# Patient Record
Sex: FEMALE | Race: WHITE | HISPANIC OR LATINO | URBAN - METROPOLITAN AREA
[De-identification: names, ages, dates, MRNs, and addresses within clinical notes are randomized per-mention and may not be internally consistent; named-entity substitution may affect disease eponyms.]

---

## 2020-08-31 ENCOUNTER — EMERGENCY (EMERGENCY)
Facility: HOSPITAL | Age: 35
LOS: 1 days | Discharge: HOME | End: 2020-08-31
Attending: STUDENT IN AN ORGANIZED HEALTH CARE EDUCATION/TRAINING PROGRAM | Admitting: EMERGENCY MEDICINE
Payer: COMMERCIAL

## 2020-08-31 VITALS
OXYGEN SATURATION: 100 % | RESPIRATION RATE: 16 BRPM | DIASTOLIC BLOOD PRESSURE: 84 MMHG | SYSTOLIC BLOOD PRESSURE: 134 MMHG | TEMPERATURE: 98 F | HEART RATE: 87 BPM

## 2020-08-31 LAB
ALBUMIN SERPL ELPH-MCNC: 4.8 G/DL — SIGNIFICANT CHANGE UP (ref 3.3–5)
ALP SERPL-CCNC: 77 U/L — SIGNIFICANT CHANGE UP (ref 40–120)
ALT FLD-CCNC: 13 U/L — SIGNIFICANT CHANGE UP (ref 4–33)
ANION GAP SERPL CALC-SCNC: 12 MMO/L — SIGNIFICANT CHANGE UP (ref 7–14)
AST SERPL-CCNC: 17 U/L — SIGNIFICANT CHANGE UP (ref 4–32)
BASE EXCESS BLDV CALC-SCNC: 0.6 MMOL/L — SIGNIFICANT CHANGE UP
BASOPHILS # BLD AUTO: 0.04 K/UL — SIGNIFICANT CHANGE UP (ref 0–0.2)
BASOPHILS NFR BLD AUTO: 0.2 % — SIGNIFICANT CHANGE UP (ref 0–2)
BILIRUB SERPL-MCNC: < 0.2 MG/DL — LOW (ref 0.2–1.2)
BLOOD GAS VENOUS - CREATININE: 0.67 MG/DL — SIGNIFICANT CHANGE UP (ref 0.5–1.3)
BUN SERPL-MCNC: 8 MG/DL — SIGNIFICANT CHANGE UP (ref 7–23)
CALCIUM SERPL-MCNC: 9.5 MG/DL — SIGNIFICANT CHANGE UP (ref 8.4–10.5)
CHLORIDE BLDV-SCNC: 106 MMOL/L — SIGNIFICANT CHANGE UP (ref 96–108)
CHLORIDE SERPL-SCNC: 102 MMOL/L — SIGNIFICANT CHANGE UP (ref 98–107)
CO2 SERPL-SCNC: 24 MMOL/L — SIGNIFICANT CHANGE UP (ref 22–31)
CREAT SERPL-MCNC: 0.67 MG/DL — SIGNIFICANT CHANGE UP (ref 0.5–1.3)
EOSINOPHIL # BLD AUTO: 0 K/UL — SIGNIFICANT CHANGE UP (ref 0–0.5)
EOSINOPHIL NFR BLD AUTO: 0 % — SIGNIFICANT CHANGE UP (ref 0–6)
GAS PNL BLDV: 137 MMOL/L — SIGNIFICANT CHANGE UP (ref 136–146)
GLUCOSE BLDV-MCNC: 103 MG/DL — HIGH (ref 70–99)
GLUCOSE SERPL-MCNC: 98 MG/DL — SIGNIFICANT CHANGE UP (ref 70–99)
HCG SERPL-ACNC: 22.98 MIU/ML — SIGNIFICANT CHANGE UP
HCO3 BLDV-SCNC: 23 MMOL/L — SIGNIFICANT CHANGE UP (ref 20–27)
HCT VFR BLD CALC: 35.4 % — SIGNIFICANT CHANGE UP (ref 34.5–45)
HCT VFR BLDV CALC: 38.4 % — SIGNIFICANT CHANGE UP (ref 34.5–45)
HGB BLD-MCNC: 11.8 G/DL — SIGNIFICANT CHANGE UP (ref 11.5–15.5)
HGB BLDV-MCNC: 12.5 G/DL — SIGNIFICANT CHANGE UP (ref 11.5–15.5)
IMM GRANULOCYTES NFR BLD AUTO: 0.4 % — SIGNIFICANT CHANGE UP (ref 0–1.5)
LACTATE BLDV-MCNC: 1.9 MMOL/L — SIGNIFICANT CHANGE UP (ref 0.5–2)
LIDOCAIN IGE QN: 25.4 U/L — SIGNIFICANT CHANGE UP (ref 7–60)
LYMPHOCYTES # BLD AUTO: 1.92 K/UL — SIGNIFICANT CHANGE UP (ref 1–3.3)
LYMPHOCYTES # BLD AUTO: 11.9 % — LOW (ref 13–44)
MCHC RBC-ENTMCNC: 25.8 PG — LOW (ref 27–34)
MCHC RBC-ENTMCNC: 33.3 % — SIGNIFICANT CHANGE UP (ref 32–36)
MCV RBC AUTO: 77.3 FL — LOW (ref 80–100)
MONOCYTES # BLD AUTO: 0.6 K/UL — SIGNIFICANT CHANGE UP (ref 0–0.9)
MONOCYTES NFR BLD AUTO: 3.7 % — SIGNIFICANT CHANGE UP (ref 2–14)
NEUTROPHILS # BLD AUTO: 13.53 K/UL — HIGH (ref 1.8–7.4)
NEUTROPHILS NFR BLD AUTO: 83.8 % — HIGH (ref 43–77)
NRBC # FLD: 0 K/UL — SIGNIFICANT CHANGE UP (ref 0–0)
PCO2 BLDV: 50 MMHG — SIGNIFICANT CHANGE UP (ref 41–51)
PH BLDV: 7.33 PH — SIGNIFICANT CHANGE UP (ref 7.32–7.43)
PLATELET # BLD AUTO: 437 K/UL — HIGH (ref 150–400)
PMV BLD: 9.8 FL — SIGNIFICANT CHANGE UP (ref 7–13)
PO2 BLDV: 26 MMHG — LOW (ref 35–40)
POTASSIUM BLDV-SCNC: 3.7 MMOL/L — SIGNIFICANT CHANGE UP (ref 3.4–4.5)
POTASSIUM SERPL-MCNC: 4.2 MMOL/L — SIGNIFICANT CHANGE UP (ref 3.5–5.3)
POTASSIUM SERPL-SCNC: 4.2 MMOL/L — SIGNIFICANT CHANGE UP (ref 3.5–5.3)
PROT SERPL-MCNC: 8.2 G/DL — SIGNIFICANT CHANGE UP (ref 6–8.3)
RBC # BLD: 4.58 M/UL — SIGNIFICANT CHANGE UP (ref 3.8–5.2)
RBC # FLD: 13.8 % — SIGNIFICANT CHANGE UP (ref 10.3–14.5)
SAO2 % BLDV: 39.1 % — LOW (ref 60–85)
SODIUM SERPL-SCNC: 138 MMOL/L — SIGNIFICANT CHANGE UP (ref 135–145)
WBC # BLD: 16.15 K/UL — HIGH (ref 3.8–10.5)
WBC # FLD AUTO: 16.15 K/UL — HIGH (ref 3.8–10.5)

## 2020-08-31 PROCEDURE — 99218: CPT

## 2020-08-31 PROCEDURE — 76830 TRANSVAGINAL US NON-OB: CPT | Mod: 26

## 2020-08-31 PROCEDURE — 76705 ECHO EXAM OF ABDOMEN: CPT | Mod: 26

## 2020-08-31 RX ORDER — MORPHINE SULFATE 50 MG/1
4 CAPSULE, EXTENDED RELEASE ORAL ONCE
Refills: 0 | Status: DISCONTINUED | OUTPATIENT
Start: 2020-08-31 | End: 2020-08-31

## 2020-08-31 RX ORDER — ONDANSETRON 8 MG/1
4 TABLET, FILM COATED ORAL ONCE
Refills: 0 | Status: COMPLETED | OUTPATIENT
Start: 2020-08-31 | End: 2020-08-31

## 2020-08-31 RX ORDER — ACETAMINOPHEN 500 MG
1000 TABLET ORAL ONCE
Refills: 0 | Status: COMPLETED | OUTPATIENT
Start: 2020-08-31 | End: 2020-08-31

## 2020-08-31 RX ORDER — SODIUM CHLORIDE 9 MG/ML
1000 INJECTION INTRAMUSCULAR; INTRAVENOUS; SUBCUTANEOUS ONCE
Refills: 0 | Status: COMPLETED | OUTPATIENT
Start: 2020-08-31 | End: 2020-08-31

## 2020-08-31 RX ORDER — SODIUM CHLORIDE 9 MG/ML
1000 INJECTION INTRAMUSCULAR; INTRAVENOUS; SUBCUTANEOUS
Refills: 0 | Status: DISCONTINUED | OUTPATIENT
Start: 2020-08-31 | End: 2020-09-04

## 2020-08-31 RX ORDER — KETOROLAC TROMETHAMINE 30 MG/ML
15 SYRINGE (ML) INJECTION ONCE
Refills: 0 | Status: DISCONTINUED | OUTPATIENT
Start: 2020-08-31 | End: 2020-08-31

## 2020-08-31 RX ADMIN — Medication 1000 MILLIGRAM(S): at 23:45

## 2020-08-31 RX ADMIN — Medication 400 MILLIGRAM(S): at 23:16

## 2020-08-31 RX ADMIN — MORPHINE SULFATE 4 MILLIGRAM(S): 50 CAPSULE, EXTENDED RELEASE ORAL at 18:51

## 2020-08-31 RX ADMIN — ONDANSETRON 4 MILLIGRAM(S): 8 TABLET, FILM COATED ORAL at 18:46

## 2020-08-31 RX ADMIN — SODIUM CHLORIDE 1000 MILLILITER(S): 9 INJECTION INTRAMUSCULAR; INTRAVENOUS; SUBCUTANEOUS at 17:16

## 2020-08-31 NOTE — ED PROVIDER NOTE - ATTENDING CONTRIBUTION TO CARE
34 y/o F with np PMH p/w 1 day of rlq pain. Pain has been increasing since the am. She denies fever, chills, chest pain. LMP 2 weeks prior. she is currently on fertility treatment with clomipene and HCG injections. She states the pain was periumbilical then moved to the rlq was 8/10 then improved. She  had some nausea w/ the symptoms no vomiting. denies vaginal bleeding dysuria.   denies fever, chills, chest pain, SOB, +abdominal pain, diarrhea, dysuria, syncope, bleeding, new rash,weakness, numbness, blurred vision    ROS  otherwise negative as per HPI  Gen: Awake, Alert, WD, WN, NAD  Head:  NC/AT  Eyes:  PERRL, EOMI, Conjunctiva pink, lids normal, no scleral icterus  ENT: OP clear, no exudates, no erythema, uvula midline, moist mucus membranes  Neck: supple, nontender, no meningismus, no JVD, trachea midline  Cardiac/CV:  S1 S2, RRR, no M/G/R  Respiratory/Pulm:  CTAB, good air movement, normal resp effort, no wheezes/stridor/retractions/rales/rhonchi  Gastrointestinal/Abdomen:  Soft, tender RLQ, nondistended, +BS, no rebound/guarding  Back:  no CVAT, no MLT  Ext:  warm, well perfused, moving all extremities spontaneously, no peripheral edema, distal pulses intact  Skin: intact, no rash  Neuro:  AAOx3, sensation intact, motor 5/5 x 4 extremities, normal gait, speech clear

## 2020-08-31 NOTE — ED CDU PROVIDER INITIAL DAY NOTE - OBJECTIVE STATEMENT
HPI- Patient is a 35 y.o female with no known PMHx who presents to ED c/o Rt side abdominal pain x 1 day. Pt states that starting yesterday she woke up with sharp pain to Rt side of abdomen. Notes pain is intermittent and waxes and wanes in severity. Pt initially went to Urgent care but was advised to come to ED.  Pt notes that she is currently undergoing fertility treatment, clomipene and HCG injections, last treatment 5 days ago. Pt follows with Dr. Veronica Araiza. Pt seen and worked up in ED. +leukocytosis. Hcg 22.98, TVUS showing b/l hemorrhagic cysts and enlarged ovaries b/l due to fertility tx. Pt transferred to CDU for pain control and MRI a/p to r/o appendicitis.

## 2020-08-31 NOTE — ED ADULT NURSE NOTE - OBJECTIVE STATEMENT
Pt c/o abdominal pain, denies N/V, diarrhea. CT ordered, labs collected, examined by MD, continue to monitor.

## 2020-08-31 NOTE — ED CDU PROVIDER INITIAL DAY NOTE - PHYSICAL EXAMINATION
Vital signs reviewed.   CONSTITUTIONAL: Well-appearing; well-nourished; in no apparent distress. Non-toxic appearing.   HEAD: Normocephalic, atraumatic.  EYES: PERRL, EOM intact, conjunctiva and sclera WNL.  ENT: normal nose; no rhinorrhea;  CARD: Normal S1, S2; no murmurs, rubs, or gallops noted.  RESP: Normal chest excursion with respiration; breath sounds clear and equal bilaterally; no wheezes, rhonchi, or rales.  ABD/GI: soft, non-distended; +RLQ abd ttp.   EXT/MS: moves all extremities;  SKIN: Normal for age and race  NEURO: Awake, alert, oriented x 3, no gross deficits  PSYCH: Normal mood; appropriate affect.

## 2020-08-31 NOTE — ED CDU PROVIDER INITIAL DAY NOTE - ATTENDING CONTRIBUTION TO CARE
35 y.o female with RLQ pain sent to CDU for MRI r/o appy,  Pt notes that she is currently undergoing fertility treatment, last treatment 5 days ago. Pt seen and worked up in ED. +leukocytosis. Hcg 22.98, TVUS showing b/l hemorrhagic cysts and enlarged ovaries b/l due to fertility tx. Pt transferred to CDU for pain control and MRI a/p to r/o appendicitis.

## 2020-08-31 NOTE — ED ADULT TRIAGE NOTE - CHIEF COMPLAINT QUOTE
states " I am having right sided abdominal pain with nausea since this morning and sent in by my doctor to get a cat scan ."

## 2020-08-31 NOTE — ED CDU PROVIDER INITIAL DAY NOTE - MEDICAL DECISION MAKING DETAILS
Patient is a 35 y.o female with no known PMHx who presents to ED c/o Rt side abdominal pain x 1 day. Pt notes that she is currently undergoing fertility treatment, last treatment 5 days ago. Pt seen and worked up in ED. +leukocytosis. Hcg 22.98, TVUS showing b/l hemorrhagic cysts and enlarged ovaries b/l due to fertility tx. Pt transferred to CDU for pain control and MRI a/p to r/o appendicitis.

## 2020-08-31 NOTE — ED PROVIDER NOTE - PROGRESS NOTE DETAILS
Pt w/ positive pregnancy test due to HCG injections for fertility treatment, pt explained ct scan risk and radiation of pregnant , she understands states she is not pregnant and is aware of risk, form signed. Dr. Waters: I have personally seen and examined this patient at the bedside. I have fully participated in the care of this patient. I have reviewed all pertinent clinical information, including history, physical exam, plan and the PA's note and agree except as noted. HPI above as by me. PE above as by me. DDX PLAN

## 2020-08-31 NOTE — ED ADULT NURSE REASSESSMENT NOTE - NS ED NURSE REASSESS COMMENT FT1
pt A&Ox4 sitting on stretcher appears comfortable, c/o returning abdominal pain. COVID swab done. awaiting COVIDr for CDU bed. asking for food. ELISHA Diggs notified, pt to remain NPO until test in AM. pt updated.

## 2020-08-31 NOTE — ED PROVIDER NOTE - CLINICAL SUMMARY MEDICAL DECISION MAKING FREE TEXT BOX
34 y/o F with no PMH currently on fertility treatment p/w 1 day of rlq pain. Pain has been increasing since the am located in RLQ. pt w/ normal pelvic exam, concern for appendicitis vs colitis vs less likely ovarian pathology w/ normal pelvic exam , will obtain cbc, cmp, ua, vbg, lipase, ct abdomen pelvic, hcg + pt w/ hcg injections.

## 2020-09-01 VITALS
TEMPERATURE: 99 F | RESPIRATION RATE: 18 BRPM | DIASTOLIC BLOOD PRESSURE: 79 MMHG | OXYGEN SATURATION: 99 % | HEART RATE: 72 BPM | SYSTOLIC BLOOD PRESSURE: 121 MMHG

## 2020-09-01 LAB
ALBUMIN SERPL ELPH-MCNC: 4 G/DL — SIGNIFICANT CHANGE UP (ref 3.3–5)
ALP SERPL-CCNC: 69 U/L — SIGNIFICANT CHANGE UP (ref 40–120)
ALT FLD-CCNC: 13 U/L — SIGNIFICANT CHANGE UP (ref 4–33)
ANION GAP SERPL CALC-SCNC: 15 MMO/L — HIGH (ref 7–14)
APPEARANCE UR: SIGNIFICANT CHANGE UP
AST SERPL-CCNC: 15 U/L — SIGNIFICANT CHANGE UP (ref 4–32)
BACTERIA # UR AUTO: HIGH
BASOPHILS # BLD AUTO: 0.06 K/UL — SIGNIFICANT CHANGE UP (ref 0–0.2)
BASOPHILS NFR BLD AUTO: 0.3 % — SIGNIFICANT CHANGE UP (ref 0–2)
BILIRUB SERPL-MCNC: 0.3 MG/DL — SIGNIFICANT CHANGE UP (ref 0.2–1.2)
BILIRUB UR-MCNC: NEGATIVE — SIGNIFICANT CHANGE UP
BLOOD UR QL VISUAL: NEGATIVE — SIGNIFICANT CHANGE UP
BUN SERPL-MCNC: 8 MG/DL — SIGNIFICANT CHANGE UP (ref 7–23)
CALCIUM SERPL-MCNC: 9.2 MG/DL — SIGNIFICANT CHANGE UP (ref 8.4–10.5)
CHLORIDE SERPL-SCNC: 103 MMOL/L — SIGNIFICANT CHANGE UP (ref 98–107)
CO2 SERPL-SCNC: 21 MMOL/L — LOW (ref 22–31)
COLOR SPEC: SIGNIFICANT CHANGE UP
CREAT SERPL-MCNC: 0.68 MG/DL — SIGNIFICANT CHANGE UP (ref 0.5–1.3)
EOSINOPHIL # BLD AUTO: 0.01 K/UL — SIGNIFICANT CHANGE UP (ref 0–0.5)
EOSINOPHIL NFR BLD AUTO: 0.1 % — SIGNIFICANT CHANGE UP (ref 0–6)
GLUCOSE SERPL-MCNC: 91 MG/DL — SIGNIFICANT CHANGE UP (ref 70–99)
GLUCOSE UR-MCNC: NEGATIVE — SIGNIFICANT CHANGE UP
HCT VFR BLD CALC: 32.4 % — LOW (ref 34.5–45)
HGB BLD-MCNC: 11 G/DL — LOW (ref 11.5–15.5)
IMM GRANULOCYTES NFR BLD AUTO: 0.5 % — SIGNIFICANT CHANGE UP (ref 0–1.5)
KETONES UR-MCNC: SIGNIFICANT CHANGE UP
LEUKOCYTE ESTERASE UR-ACNC: NEGATIVE — SIGNIFICANT CHANGE UP
LYMPHOCYTES # BLD AUTO: 15.2 % — SIGNIFICANT CHANGE UP (ref 13–44)
LYMPHOCYTES # BLD AUTO: 2.73 K/UL — SIGNIFICANT CHANGE UP (ref 1–3.3)
MCHC RBC-ENTMCNC: 26.3 PG — LOW (ref 27–34)
MCHC RBC-ENTMCNC: 34 % — SIGNIFICANT CHANGE UP (ref 32–36)
MCV RBC AUTO: 77.3 FL — LOW (ref 80–100)
MONOCYTES # BLD AUTO: 1.02 K/UL — HIGH (ref 0–0.9)
MONOCYTES NFR BLD AUTO: 5.7 % — SIGNIFICANT CHANGE UP (ref 2–14)
NEUTROPHILS # BLD AUTO: 14.06 K/UL — HIGH (ref 1.8–7.4)
NEUTROPHILS NFR BLD AUTO: 78.2 % — HIGH (ref 43–77)
NITRITE UR-MCNC: NEGATIVE — SIGNIFICANT CHANGE UP
NRBC # FLD: 0 K/UL — SIGNIFICANT CHANGE UP (ref 0–0)
PH UR: 6 — SIGNIFICANT CHANGE UP (ref 5–8)
PLATELET # BLD AUTO: 394 K/UL — SIGNIFICANT CHANGE UP (ref 150–400)
PMV BLD: 10.1 FL — SIGNIFICANT CHANGE UP (ref 7–13)
POTASSIUM SERPL-MCNC: 4 MMOL/L — SIGNIFICANT CHANGE UP (ref 3.5–5.3)
POTASSIUM SERPL-SCNC: 4 MMOL/L — SIGNIFICANT CHANGE UP (ref 3.5–5.3)
PROT SERPL-MCNC: 7.5 G/DL — SIGNIFICANT CHANGE UP (ref 6–8.3)
PROT UR-MCNC: 10 — SIGNIFICANT CHANGE UP
RBC # BLD: 4.19 M/UL — SIGNIFICANT CHANGE UP (ref 3.8–5.2)
RBC # FLD: 13.8 % — SIGNIFICANT CHANGE UP (ref 10.3–14.5)
RBC CASTS # UR COMP ASSIST: SIGNIFICANT CHANGE UP (ref 0–?)
SARS-COV-2 RNA SPEC QL NAA+PROBE: SIGNIFICANT CHANGE UP
SODIUM SERPL-SCNC: 139 MMOL/L — SIGNIFICANT CHANGE UP (ref 135–145)
SP GR SPEC: 1.02 — SIGNIFICANT CHANGE UP (ref 1–1.04)
SQUAMOUS # UR AUTO: SIGNIFICANT CHANGE UP
UROBILINOGEN FLD QL: NORMAL — SIGNIFICANT CHANGE UP
WBC # BLD: 17.97 K/UL — HIGH (ref 3.8–10.5)
WBC # FLD AUTO: 17.97 K/UL — HIGH (ref 3.8–10.5)
WBC UR QL: HIGH (ref 0–?)

## 2020-09-01 PROCEDURE — 99217: CPT

## 2020-09-01 PROCEDURE — 72195 MRI PELVIS W/O DYE: CPT | Mod: 26

## 2020-09-01 PROCEDURE — 74181 MRI ABDOMEN W/O CONTRAST: CPT | Mod: 26

## 2020-09-01 RX ORDER — OMEPRAZOLE 10 MG/1
1 CAPSULE, DELAYED RELEASE ORAL
Qty: 30 | Refills: 0
Start: 2020-09-01 | End: 2020-09-30

## 2020-09-01 RX ORDER — ONDANSETRON 8 MG/1
1 TABLET, FILM COATED ORAL
Qty: 15 | Refills: 0
Start: 2020-09-01 | End: 2020-09-05

## 2020-09-01 RX ORDER — MORPHINE SULFATE 50 MG/1
4 CAPSULE, EXTENDED RELEASE ORAL ONCE
Refills: 0 | Status: DISCONTINUED | OUTPATIENT
Start: 2020-09-01 | End: 2020-09-01

## 2020-09-01 RX ADMIN — MORPHINE SULFATE 4 MILLIGRAM(S): 50 CAPSULE, EXTENDED RELEASE ORAL at 05:50

## 2020-09-01 RX ADMIN — MORPHINE SULFATE 4 MILLIGRAM(S): 50 CAPSULE, EXTENDED RELEASE ORAL at 05:33

## 2020-09-01 RX ADMIN — SODIUM CHLORIDE 100 MILLILITER(S): 9 INJECTION INTRAMUSCULAR; INTRAVENOUS; SUBCUTANEOUS at 00:48

## 2020-09-01 NOTE — ED CDU PROVIDER SUBSEQUENT DAY NOTE - PROGRESS NOTE DETAILS
Patient still having intermittent pain. Will give analgesic at this time. MRI results pending. The patient is resting comfortably and feels better, is alert and in no distress. The repeat examination is unremarkable and benign; in particular, there is no discomfort at McBurney's point and there is no pulsatile mass. The history, exam, diagnostic testing and current condition do not suggest acute appendicitis, bowel obstruction, acute cholecystitis, bowel perforation, major GI bleeding, severe diverticulitis, AAA, mesenteric ischemia, volvulus, sepsis, or other significant pathology to warrant further testing, continued ED treatment, admission, or surgical evaluation at this time. The vital signs have been stable. The patient does not have uncontrollable pain, intractable vomiting, or other significant symptoms. The patients condition is stable and appropriate for discharge from the ED. The patient will pursue further outpatient evaluation with the primary care physician or other designated or consulting physician as indicated in discharge instructions. Pt has no complaints, states abd pain much improved.  Discussed findings of MRI.  Will dc with pcp and fertility f/u as o/p.

## 2020-09-01 NOTE — ED CDU PROVIDER DISPOSITION NOTE - NSFOLLOWUPINSTRUCTIONS_ED_ALL_ED_FT
Advance activity as tolerated.  Continue all previously prescribed medications as directed unless otherwise instructed.  Follow up with your primary care physician in 48-72 hours- bring copies of your results.  Return to the ER for worsening or persistent symptoms, and/or ANY NEW OR CONCERNING SYMPTOMS. If you have issues obtaining follow up, please call: [phone] to obtain a doctor or specialist who takes your insurance in your area.  You may call [phone] to make an appointment with the internal medicine clinic.

## 2020-09-01 NOTE — ED CDU PROVIDER SUBSEQUENT DAY NOTE - PHYSICAL EXAMINATION
CONSTITUTIONAL: Well-appearing; well-nourished; in no apparent distress. Non-toxic appearing.   HEAD: Normocephalic, atraumatic.  EYES: PERRL, EOM intact, conjunctiva and sclera WNL.  ENT: normal nose; no rhinorrhea;  CARD: Normal S1, S2; no murmurs, rubs, or gallops noted.  RESP: Normal chest excursion with respiration; breath sounds clear and equal bilaterally; no wheezes, rhonchi, or rales.  ABD/GI: soft, non-distended; +RLQ abd ttp.   EXT/MS: moves all extremities;  SKIN: Normal for age and race  NEURO: Awake, alert, oriented x 3, no gross deficits  PSYCH: Normal mood; appropriate affect.

## 2020-09-01 NOTE — ED CDU PROVIDER DISPOSITION NOTE - CLINICAL COURSE
36 y/o female on fertility treatment with abd pain.  Sent to cdu for mri abd.  No acute intra-abd process.  Likely ovarian hyperstimulation syndrome.  Pain controlled.  Discharged with pcp and fertility md f/u as o/p.

## 2020-09-01 NOTE — ED CDU PROVIDER DISPOSITION NOTE - NSFOLLOWUPCLINICS_GEN_ALL_ED_FT
[facility]  Gastroenterology  [address] [place] [id number]  Phone: [phone]  Fax:   Follow Up Time:

## 2020-09-01 NOTE — ED CDU PROVIDER SUBSEQUENT DAY NOTE - HISTORY
CDU Initial Day Note: Patient is a 35 y.o female with no known PMHx who presents to ED c/o Rt side abdominal pain x 1 day. Pt notes that she is currently undergoing fertility treatment, last treatment 5 days ago. Pt seen and worked up in ED. +leukocytosis. Hcg 22.98, TVUS showing b/l hemorrhagic cysts and enlarged ovaries b/l due to fertility tx. Pt transferred to CDU for pain control and MRI a/p to r/o appendicitis.  CDU Subsequent Day Note: ELISHA Yee, Agree w/ above, pt feeling better, mild R sided abd pain, no acute intra-abdominal pathology noted on MR or US, appears very well, tolerating PO, stable to be discharged will give f/u info for GI.

## 2020-09-01 NOTE — ED CDU PROVIDER SUBSEQUENT DAY NOTE - ATTENDING CONTRIBUTION TO CARE
CDU MD CAMPOVERDE:  I performed a face to face bedside interview with patient regarding history of present illness, review of symptoms and past medical history. I completed an independent physical exam.  I have discussed patient's plan of care with PA.   I agree with note as stated above, having amended the EMR as needed to reflect my findings. I have discussed the assessment and plan of care.  This includes during the time I functioned as the attending physician for this patient.

## 2020-09-01 NOTE — ED CDU PROVIDER DISPOSITION NOTE - PATIENT PORTAL LINK FT
You can access the FollowMyHealth Patient Portal offered by [facility] by registering at the following website: [email or web address]. By joining [facility]’s FollowMyHealth portal, you will also be able to view your health information using other applications (apps) compatible with our system.

## 2020-09-04 NOTE — ED POST DISCHARGE NOTE - RESULT SUMMARY
ELISHA Osborn: UCX ecoli 50,000-99,000 cfu, pt seen for abdominal pain in the context of receiving fertility treatments. Pt called back to start abx, no answer, left message for callback. Callback PA to try to reach pt again.

## 2020-09-05 RX ORDER — CEPHALEXIN 500 MG
1 CAPSULE ORAL
Qty: 14 | Refills: 0
Start: 2020-09-05 | End: 2020-09-11

## 2020-09-26 ENCOUNTER — TRANSCRIPTION ENCOUNTER (OUTPATIENT)
Age: 35
End: 2020-09-26

## 2020-12-15 ENCOUNTER — TRANSCRIPTION ENCOUNTER (OUTPATIENT)
Age: 35
End: 2020-12-15

## 2021-05-17 ENCOUNTER — APPOINTMENT (OUTPATIENT)
Dept: PEDIATRIC CARDIOLOGY | Facility: CLINIC | Age: 36
End: 2021-05-17

## 2021-05-17 PROBLEM — Z00.00 ENCOUNTER FOR PREVENTIVE HEALTH EXAMINATION: Status: ACTIVE | Noted: 2021-05-17

## 2021-05-20 ENCOUNTER — APPOINTMENT (OUTPATIENT)
Dept: PEDIATRIC CARDIOLOGY | Facility: CLINIC | Age: 36
End: 2021-05-20
Payer: COMMERCIAL

## 2021-05-20 PROCEDURE — 93325 DOPPLER ECHO COLOR FLOW MAPG: CPT | Mod: 59

## 2021-05-20 PROCEDURE — 76820 UMBILICAL ARTERY ECHO: CPT | Mod: 59

## 2021-05-20 PROCEDURE — 76825 ECHO EXAM OF FETAL HEART: CPT

## 2021-05-20 PROCEDURE — 99202 OFFICE O/P NEW SF 15 MIN: CPT

## 2021-05-20 PROCEDURE — 76821 MIDDLE CEREBRAL ARTERY ECHO: CPT

## 2021-05-20 PROCEDURE — 76827 ECHO EXAM OF FETAL HEART: CPT | Mod: 59

## 2021-05-20 PROCEDURE — 76821 MIDDLE CEREBRAL ARTERY ECHO: CPT | Mod: 59

## 2021-05-20 PROCEDURE — 76825 ECHO EXAM OF FETAL HEART: CPT | Mod: 59

## 2021-05-20 PROCEDURE — 76820 UMBILICAL ARTERY ECHO: CPT

## 2021-06-29 ENCOUNTER — APPOINTMENT (OUTPATIENT)
Dept: MATERNAL FETAL MEDICINE | Facility: CLINIC | Age: 36
End: 2021-06-29
Payer: COMMERCIAL

## 2021-06-29 ENCOUNTER — ASOB RESULT (OUTPATIENT)
Age: 36
End: 2021-06-29

## 2021-06-29 PROCEDURE — G0109 DIAB MANAGE TRN IND/GROUP: CPT | Mod: 95

## 2021-06-29 RX ORDER — LANCETS 33 GAUGE
EACH MISCELLANEOUS
Qty: 2 | Refills: 1 | Status: ACTIVE | COMMUNITY
Start: 2021-06-29 | End: 1900-01-01

## 2021-06-29 RX ORDER — URINE ACETONE TEST STRIPS
STRIP MISCELLANEOUS
Qty: 1 | Refills: 0 | Status: ACTIVE | COMMUNITY
Start: 2021-06-29 | End: 1900-01-01

## 2021-06-29 RX ORDER — BLOOD-GLUCOSE METER
KIT MISCELLANEOUS 4 TIMES DAILY
Qty: 150 | Refills: 2 | Status: ACTIVE | COMMUNITY
Start: 2021-06-29 | End: 1900-01-01

## 2021-06-29 RX ORDER — BLOOD-GLUCOSE METER
W/DEVICE KIT MISCELLANEOUS
Qty: 1 | Refills: 0 | Status: ACTIVE | COMMUNITY
Start: 2021-06-29 | End: 1900-01-01

## 2021-07-15 ENCOUNTER — APPOINTMENT (OUTPATIENT)
Dept: MATERNAL FETAL MEDICINE | Facility: CLINIC | Age: 36
End: 2021-07-15
Payer: COMMERCIAL

## 2021-07-15 ENCOUNTER — ASOB RESULT (OUTPATIENT)
Age: 36
End: 2021-07-15

## 2021-07-15 PROCEDURE — G0108 DIAB MANAGE TRN  PER INDIV: CPT | Mod: 95

## 2021-07-30 ENCOUNTER — ASOB RESULT (OUTPATIENT)
Age: 36
End: 2021-07-30

## 2021-07-30 ENCOUNTER — APPOINTMENT (OUTPATIENT)
Dept: MATERNAL FETAL MEDICINE | Facility: CLINIC | Age: 36
End: 2021-07-30
Payer: COMMERCIAL

## 2021-07-30 PROCEDURE — G0108 DIAB MANAGE TRN  PER INDIV: CPT | Mod: 95

## 2021-08-19 ENCOUNTER — APPOINTMENT (OUTPATIENT)
Dept: MATERNAL FETAL MEDICINE | Facility: CLINIC | Age: 36
End: 2021-08-19
Payer: COMMERCIAL

## 2021-08-19 ENCOUNTER — ASOB RESULT (OUTPATIENT)
Age: 36
End: 2021-08-19

## 2021-08-19 PROCEDURE — G0108 DIAB MANAGE TRN  PER INDIV: CPT | Mod: 95

## 2021-08-27 ENCOUNTER — OUTPATIENT (OUTPATIENT)
Dept: OUTPATIENT SERVICES | Facility: HOSPITAL | Age: 36
LOS: 1 days | End: 2021-08-27
Payer: COMMERCIAL

## 2021-08-27 VITALS
TEMPERATURE: 98 F | SYSTOLIC BLOOD PRESSURE: 128 MMHG | WEIGHT: 235.01 LBS | HEIGHT: 69 IN | OXYGEN SATURATION: 96 % | DIASTOLIC BLOOD PRESSURE: 89 MMHG | RESPIRATION RATE: 18 BRPM | HEART RATE: 101 BPM

## 2021-08-27 DIAGNOSIS — Z01.818 ENCOUNTER FOR OTHER PREPROCEDURAL EXAMINATION: ICD-10-CM

## 2021-08-27 DIAGNOSIS — Z98.890 OTHER SPECIFIED POSTPROCEDURAL STATES: Chronic | ICD-10-CM

## 2021-08-27 DIAGNOSIS — O30.049 TWIN PREGNANCY, DICHORIONIC/DIAMNIOTIC, UNSPECIFIED TRIMESTER: ICD-10-CM

## 2021-08-27 DIAGNOSIS — O30.043 TWIN PREGNANCY, DICHORIONIC/DIAMNIOTIC, THIRD TRIMESTER: ICD-10-CM

## 2021-08-27 LAB
A1C WITH ESTIMATED AVERAGE GLUCOSE RESULT: 5.8 % — HIGH (ref 4–5.6)
ANION GAP SERPL CALC-SCNC: 15 MMOL/L — SIGNIFICANT CHANGE UP (ref 5–17)
BLD GP AB SCN SERPL QL: NEGATIVE — SIGNIFICANT CHANGE UP
BUN SERPL-MCNC: <4 MG/DL — LOW (ref 7–23)
CALCIUM SERPL-MCNC: 9.2 MG/DL — SIGNIFICANT CHANGE UP (ref 8.4–10.5)
CHLORIDE SERPL-SCNC: 103 MMOL/L — SIGNIFICANT CHANGE UP (ref 96–108)
CO2 SERPL-SCNC: 18 MMOL/L — LOW (ref 22–31)
CREAT SERPL-MCNC: 0.63 MG/DL — SIGNIFICANT CHANGE UP (ref 0.5–1.3)
ESTIMATED AVERAGE GLUCOSE: 120 MG/DL — HIGH (ref 68–114)
GLUCOSE SERPL-MCNC: 93 MG/DL — SIGNIFICANT CHANGE UP (ref 70–99)
HCT VFR BLD CALC: 28.7 % — LOW (ref 34.5–45)
HGB BLD-MCNC: 9.1 G/DL — LOW (ref 11.5–15.5)
MCHC RBC-ENTMCNC: 22.9 PG — LOW (ref 27–34)
MCHC RBC-ENTMCNC: 31.7 GM/DL — LOW (ref 32–36)
MCV RBC AUTO: 72.1 FL — LOW (ref 80–100)
NRBC # BLD: 0 /100 WBCS — SIGNIFICANT CHANGE UP (ref 0–0)
PLATELET # BLD AUTO: 501 K/UL — HIGH (ref 150–400)
POTASSIUM SERPL-MCNC: 3.7 MMOL/L — SIGNIFICANT CHANGE UP (ref 3.5–5.3)
POTASSIUM SERPL-SCNC: 3.7 MMOL/L — SIGNIFICANT CHANGE UP (ref 3.5–5.3)
RBC # BLD: 3.98 M/UL — SIGNIFICANT CHANGE UP (ref 3.8–5.2)
RBC # FLD: 15.9 % — HIGH (ref 10.3–14.5)
RH IG SCN BLD-IMP: POSITIVE — SIGNIFICANT CHANGE UP
SODIUM SERPL-SCNC: 136 MMOL/L — SIGNIFICANT CHANGE UP (ref 135–145)
WBC # BLD: 9.76 K/UL — SIGNIFICANT CHANGE UP (ref 3.8–10.5)
WBC # FLD AUTO: 9.76 K/UL — SIGNIFICANT CHANGE UP (ref 3.8–10.5)

## 2021-08-27 PROCEDURE — G0463: CPT

## 2021-08-27 PROCEDURE — 86850 RBC ANTIBODY SCREEN: CPT

## 2021-08-27 PROCEDURE — 80048 BASIC METABOLIC PNL TOTAL CA: CPT

## 2021-08-27 PROCEDURE — 85027 COMPLETE CBC AUTOMATED: CPT

## 2021-08-27 PROCEDURE — 86900 BLOOD TYPING SEROLOGIC ABO: CPT

## 2021-08-27 PROCEDURE — 83036 HEMOGLOBIN GLYCOSYLATED A1C: CPT

## 2021-08-27 PROCEDURE — 86901 BLOOD TYPING SEROLOGIC RH(D): CPT

## 2021-08-27 RX ORDER — SODIUM CHLORIDE 9 MG/ML
1000 INJECTION, SOLUTION INTRAVENOUS ONCE
Refills: 0 | Status: DISCONTINUED | OUTPATIENT
Start: 2021-09-09 | End: 2021-09-09

## 2021-08-27 RX ORDER — SODIUM CHLORIDE 9 MG/ML
1000 INJECTION, SOLUTION INTRAVENOUS
Refills: 0 | Status: DISCONTINUED | OUTPATIENT
Start: 2021-09-09 | End: 2021-09-09

## 2021-08-27 NOTE — OB PST NOTE - NSICDXFAMILYHX_GEN_ALL_CORE_FT
FAMILY HISTORY:  No pertinent family history in first degree relatives     FAMILY HISTORY:  Father  Still living? Unknown  FH: diabetes mellitus, Age at diagnosis: Age Unknown  FH: heart disease, Age at diagnosis: Age Unknown    Mother  Still living? Unknown  Family history of cervical cancer, Age at diagnosis: Age Unknown  FH: atrial fibrillation, Age at diagnosis: Age Unknown  FH: rheumatoid arthritis, Age at diagnosis: Age Unknown    Grandparent  Still living? Unknown  FH: asthma, Age at diagnosis: Age Unknown  FH: stomach cancer, Age at diagnosis: Age Unknown    Aunt  Still living? Unknown  FH: lung cancer, Age at diagnosis: Age Unknown  FH: skin cancer, Age at diagnosis: Age Unknown    Uncle  Still living? Unknown  FH: prostate cancer, Age at diagnosis: Age Unknown

## 2021-08-27 NOTE — OB PST NOTE - HISTORY OF PRESENT ILLNESS
36 year old female with history of gestational diabetes during second trimester presents today for presurgical testing. She is scheduled for Primary  for DI/DI twins on 21 (EDC:  21; ).      COVID swab- 21  COVID vaccine- Phizer 3/16/21, 21  36 year old female with history of gestational diabetes dx during second trimester presents today for presurgical testing. She is scheduled for Primary  for DI/DI twins on 21 (EDC:  21; ).  She denies recent known COVID exposure, and recent illness.  She is having swelling bilateral legs/feet.  She denies headaches and checks her BP frequently at home and has not had any elevations.  She denies s/s's of active labor.    COVID swab- 21  COVID vaccine- Phizer 3/16/21, 21

## 2021-08-27 NOTE — OB PST NOTE - NSICDXPASTMEDICALHX_GEN_ALL_CORE_FT
PAST MEDICAL HISTORY:  Anemia     Gestational diabetes managed through diet; no insulin     PAST MEDICAL HISTORY:  Anemia currently on iron    Gestational diabetes managed through diet; no insulin

## 2021-08-27 NOTE — OB PST NOTE - NSHPPHYSICALEXAM_GEN_ALL_CORE
Physical Exam:  · Constitutional-  well-developed; well-groomed; well-nourished; no distress  · Eyes- PERRLA; conjunctiva clear  · ENMT- No oral lesions; no gross abnormalities  · Neck-  supple; no JVD  · Breasts- not examined  · Back-  No deformity or limitation of movement   · Respiratory-  airway patent; breath sounds equal; good air movement; respirations non-labored; clear to auscultation bilaterally  · Cardiovascular-  regular rate and rhythm   · Gastrointestinal-  soft; nontender; no distention; bowel sounds normal; no guarding  · Genitourinary- patient refused   · Rectal-  patient refused   · Extremities- no clubbing; no cyanosis; pedal edema noted- nonpitting  · Vascular	Equal and normal pulses (carotids)   · Neurological- alert and oriented x 3  · Gait/Balance- gait steady  · Skin-  warm and dry; color normal  · Lymph Nodes	  · Posterior Cervical - normal  · Posterior Cervical R- normal  · Anterior Cervical L-  normal  · Anterior Cervical R- normal  · Supraclavicular L- normal  · Supraclavicular R-  normal  · Musculoskeletal- normal   · Psychiatric-  normal affect; normal behavior  gravid uterus denies s/s of active labor

## 2021-08-27 NOTE — OB PST NOTE - PROBLEM SELECTOR PLAN 1
-Scheduled for  Primary  for DI/DI twins on 21 (EDC:  21; )  -CBC, BMP, HgbA1C, and T&S today  -COVID vaccine (as noted in HPI)  -COVID swab scheduled for 21  -Preop instructions provided and patient stated understanding  -Surgical scrub provided, along with directions for use  -No gatorade protocol due to gestational diabetes  -No oral medications am DOS  -She was advised to f/u with OB regarding aspirin

## 2021-08-27 NOTE — OB PST NOTE - NSICDXPASTSURGICALHX_GEN_ALL_CORE_FT
PAST SURGICAL HISTORY:  H/O dilation and curettage polypectomy    S/P bilateral breast reduction

## 2021-08-31 PROBLEM — D64.9 ANEMIA, UNSPECIFIED: Chronic | Status: ACTIVE | Noted: 2021-08-27

## 2021-08-31 PROBLEM — O24.419 GESTATIONAL DIABETES MELLITUS IN PREGNANCY, UNSPECIFIED CONTROL: Chronic | Status: ACTIVE | Noted: 2021-08-27

## 2021-09-07 ENCOUNTER — OUTPATIENT (OUTPATIENT)
Dept: OUTPATIENT SERVICES | Facility: HOSPITAL | Age: 36
LOS: 1 days | End: 2021-09-07
Payer: COMMERCIAL

## 2021-09-07 DIAGNOSIS — Z00.00 ENCOUNTER FOR GENERAL ADULT MEDICAL EXAMINATION WITHOUT ABNORMAL FINDINGS: ICD-10-CM

## 2021-09-07 DIAGNOSIS — Z98.890 OTHER SPECIFIED POSTPROCEDURAL STATES: Chronic | ICD-10-CM

## 2021-09-07 DIAGNOSIS — Z11.52 ENCOUNTER FOR SCREENING FOR COVID-19: ICD-10-CM

## 2021-09-07 LAB — SARS-COV-2 RNA SPEC QL NAA+PROBE: SIGNIFICANT CHANGE UP

## 2021-09-07 PROCEDURE — C9803: CPT

## 2021-09-07 PROCEDURE — U0003: CPT

## 2021-09-07 PROCEDURE — U0005: CPT

## 2021-09-08 ENCOUNTER — TRANSCRIPTION ENCOUNTER (OUTPATIENT)
Age: 36
End: 2021-09-08

## 2021-09-09 ENCOUNTER — INPATIENT (INPATIENT)
Facility: HOSPITAL | Age: 36
LOS: 3 days | Discharge: HOME | End: 2021-09-13
Attending: OBSTETRICS & GYNECOLOGY | Admitting: OBSTETRICS & GYNECOLOGY
Payer: COMMERCIAL

## 2021-09-09 VITALS
TEMPERATURE: 98 F | HEART RATE: 96 BPM | RESPIRATION RATE: 20 BRPM | WEIGHT: 235.01 LBS | HEIGHT: 69 IN | OXYGEN SATURATION: 98 % | DIASTOLIC BLOOD PRESSURE: 117 MMHG | SYSTOLIC BLOOD PRESSURE: 176 MMHG

## 2021-09-09 DIAGNOSIS — O30.049 TWIN PREGNANCY, DICHORIONIC/DIAMNIOTIC, UNSPECIFIED TRIMESTER: ICD-10-CM

## 2021-09-09 DIAGNOSIS — Z98.890 OTHER SPECIFIED POSTPROCEDURAL STATES: Chronic | ICD-10-CM

## 2021-09-09 LAB
ALBUMIN SERPL ELPH-MCNC: 2.4 G/DL — LOW (ref 3.3–5)
ALBUMIN SERPL ELPH-MCNC: 3.2 G/DL — LOW (ref 3.3–5)
ALP SERPL-CCNC: 340 U/L — HIGH (ref 40–120)
ALP SERPL-CCNC: 453 U/L — HIGH (ref 40–120)
ALT FLD-CCNC: 15 U/L — SIGNIFICANT CHANGE UP (ref 10–45)
ALT FLD-CCNC: 9 U/L — LOW (ref 10–45)
ANION GAP SERPL CALC-SCNC: 12 MMOL/L — SIGNIFICANT CHANGE UP (ref 5–17)
ANION GAP SERPL CALC-SCNC: 15 MMOL/L — SIGNIFICANT CHANGE UP (ref 5–17)
ANISOCYTOSIS BLD QL: SLIGHT — SIGNIFICANT CHANGE UP
APPEARANCE UR: ABNORMAL
APTT BLD: 27.6 SEC — SIGNIFICANT CHANGE UP (ref 27.5–35.5)
APTT BLD: 27.7 SEC — SIGNIFICANT CHANGE UP (ref 27.5–35.5)
AST SERPL-CCNC: 21 U/L — SIGNIFICANT CHANGE UP (ref 10–40)
AST SERPL-CCNC: 22 U/L — SIGNIFICANT CHANGE UP (ref 10–40)
BACTERIA # UR AUTO: NEGATIVE — SIGNIFICANT CHANGE UP
BASOPHILS # BLD AUTO: 0.04 K/UL — SIGNIFICANT CHANGE UP (ref 0–0.2)
BASOPHILS # BLD AUTO: 0.22 K/UL — HIGH (ref 0–0.2)
BASOPHILS NFR BLD AUTO: 0.2 % — SIGNIFICANT CHANGE UP (ref 0–2)
BASOPHILS NFR BLD AUTO: 2.6 % — HIGH (ref 0–2)
BILIRUB SERPL-MCNC: 0.5 MG/DL — SIGNIFICANT CHANGE UP (ref 0.2–1.2)
BILIRUB SERPL-MCNC: 0.6 MG/DL — SIGNIFICANT CHANGE UP (ref 0.2–1.2)
BILIRUB UR-MCNC: NEGATIVE — SIGNIFICANT CHANGE UP
BUN SERPL-MCNC: 4 MG/DL — LOW (ref 7–23)
BUN SERPL-MCNC: <4 MG/DL — LOW (ref 7–23)
CALCIUM SERPL-MCNC: 7.9 MG/DL — LOW (ref 8.4–10.5)
CALCIUM SERPL-MCNC: 9.2 MG/DL — SIGNIFICANT CHANGE UP (ref 8.4–10.5)
CHLORIDE SERPL-SCNC: 105 MMOL/L — SIGNIFICANT CHANGE UP (ref 96–108)
CHLORIDE SERPL-SCNC: 108 MMOL/L — SIGNIFICANT CHANGE UP (ref 96–108)
CO2 SERPL-SCNC: 18 MMOL/L — LOW (ref 22–31)
CO2 SERPL-SCNC: 18 MMOL/L — LOW (ref 22–31)
COLOR SPEC: YELLOW — SIGNIFICANT CHANGE UP
COVID-19 SPIKE DOMAIN AB INTERP: POSITIVE
COVID-19 SPIKE DOMAIN ANTIBODY RESULT: >250 U/ML — HIGH
CREAT ?TM UR-MCNC: 105 MG/DL — SIGNIFICANT CHANGE UP
CREAT SERPL-MCNC: 0.68 MG/DL — SIGNIFICANT CHANGE UP (ref 0.5–1.3)
CREAT SERPL-MCNC: 0.68 MG/DL — SIGNIFICANT CHANGE UP (ref 0.5–1.3)
DIFF PNL FLD: NEGATIVE — SIGNIFICANT CHANGE UP
EOSINOPHIL # BLD AUTO: 0.01 K/UL — SIGNIFICANT CHANGE UP (ref 0–0.5)
EOSINOPHIL # BLD AUTO: 0.51 K/UL — HIGH (ref 0–0.5)
EOSINOPHIL NFR BLD AUTO: 0.1 % — SIGNIFICANT CHANGE UP (ref 0–6)
EOSINOPHIL NFR BLD AUTO: 6.1 % — HIGH (ref 0–6)
EPI CELLS # UR: 3 /HPF — SIGNIFICANT CHANGE UP
FIBRINOGEN PPP-MCNC: 560 MG/DL — HIGH (ref 290–520)
FIBRINOGEN PPP-MCNC: 832 MG/DL — HIGH (ref 290–520)
GIANT PLATELETS BLD QL SMEAR: PRESENT — SIGNIFICANT CHANGE UP
GLUCOSE BLDC GLUCOMTR-MCNC: 78 MG/DL — SIGNIFICANT CHANGE UP (ref 70–99)
GLUCOSE SERPL-MCNC: 81 MG/DL — SIGNIFICANT CHANGE UP (ref 70–99)
GLUCOSE SERPL-MCNC: 97 MG/DL — SIGNIFICANT CHANGE UP (ref 70–99)
GLUCOSE UR QL: NEGATIVE — SIGNIFICANT CHANGE UP
HCT VFR BLD CALC: 25.5 % — LOW (ref 34.5–45)
HCT VFR BLD CALC: 27.6 % — LOW (ref 34.5–45)
HCT VFR BLD CALC: 30.6 % — LOW (ref 34.5–45)
HGB BLD-MCNC: 7.8 G/DL — LOW (ref 11.5–15.5)
HGB BLD-MCNC: 8.9 G/DL — LOW (ref 11.5–15.5)
HGB BLD-MCNC: 9.6 G/DL — LOW (ref 11.5–15.5)
HYALINE CASTS # UR AUTO: 5 /LPF — HIGH (ref 0–2)
IMM GRANULOCYTES NFR BLD AUTO: 0.6 % — SIGNIFICANT CHANGE UP (ref 0–1.5)
INR BLD: 0.89 RATIO — SIGNIFICANT CHANGE UP (ref 0.88–1.16)
INR BLD: 0.93 RATIO — SIGNIFICANT CHANGE UP (ref 0.88–1.16)
KETONES UR-MCNC: NEGATIVE — SIGNIFICANT CHANGE UP
LDH SERPL L TO P-CCNC: 250 U/L — HIGH (ref 50–242)
LDH SERPL L TO P-CCNC: 319 U/L — HIGH (ref 50–242)
LEUKOCYTE ESTERASE UR-ACNC: NEGATIVE — SIGNIFICANT CHANGE UP
LYMPHOCYTES # BLD AUTO: 1.05 K/UL — SIGNIFICANT CHANGE UP (ref 1–3.3)
LYMPHOCYTES # BLD AUTO: 1.31 K/UL — SIGNIFICANT CHANGE UP (ref 1–3.3)
LYMPHOCYTES # BLD AUTO: 15.7 % — SIGNIFICANT CHANGE UP (ref 13–44)
LYMPHOCYTES # BLD AUTO: 5.6 % — LOW (ref 13–44)
MAGNESIUM SERPL-MCNC: 3.7 MG/DL — HIGH (ref 1.6–2.6)
MAGNESIUM SERPL-MCNC: 4.7 MG/DL — HIGH (ref 1.6–2.6)
MANUAL SMEAR VERIFICATION: SIGNIFICANT CHANGE UP
MCHC RBC-ENTMCNC: 22.5 PG — LOW (ref 27–34)
MCHC RBC-ENTMCNC: 23.2 PG — LOW (ref 27–34)
MCHC RBC-ENTMCNC: 24.7 PG — LOW (ref 27–34)
MCHC RBC-ENTMCNC: 30.6 GM/DL — LOW (ref 32–36)
MCHC RBC-ENTMCNC: 31.4 GM/DL — LOW (ref 32–36)
MCHC RBC-ENTMCNC: 32.2 GM/DL — SIGNIFICANT CHANGE UP (ref 32–36)
MCV RBC AUTO: 71.8 FL — LOW (ref 80–100)
MCV RBC AUTO: 75.9 FL — LOW (ref 80–100)
MCV RBC AUTO: 76.5 FL — LOW (ref 80–100)
MICROCYTES BLD QL: SLIGHT — SIGNIFICANT CHANGE UP
MONOCYTES # BLD AUTO: 0.36 K/UL — SIGNIFICANT CHANGE UP (ref 0–0.9)
MONOCYTES # BLD AUTO: 0.93 K/UL — HIGH (ref 0–0.9)
MONOCYTES NFR BLD AUTO: 4.3 % — SIGNIFICANT CHANGE UP (ref 2–14)
MONOCYTES NFR BLD AUTO: 5 % — SIGNIFICANT CHANGE UP (ref 2–14)
NEUTROPHILS # BLD AUTO: 16.55 K/UL — HIGH (ref 1.8–7.4)
NEUTROPHILS # BLD AUTO: 5.96 K/UL — SIGNIFICANT CHANGE UP (ref 1.8–7.4)
NEUTROPHILS NFR BLD AUTO: 71.3 % — SIGNIFICANT CHANGE UP (ref 43–77)
NEUTROPHILS NFR BLD AUTO: 88.5 % — HIGH (ref 43–77)
NITRITE UR-MCNC: NEGATIVE — SIGNIFICANT CHANGE UP
NRBC # BLD: 0 /100 WBCS — SIGNIFICANT CHANGE UP (ref 0–0)
NRBC # BLD: 0 /100 WBCS — SIGNIFICANT CHANGE UP (ref 0–0)
NRBC # BLD: 1 /100 — HIGH (ref 0–0)
PH UR: 7 — SIGNIFICANT CHANGE UP (ref 5–8)
PLAT MORPH BLD: NORMAL — SIGNIFICANT CHANGE UP
PLATELET # BLD AUTO: 356 K/UL — SIGNIFICANT CHANGE UP (ref 150–400)
PLATELET # BLD AUTO: 361 K/UL — SIGNIFICANT CHANGE UP (ref 150–400)
PLATELET # BLD AUTO: 455 K/UL — HIGH (ref 150–400)
POLYCHROMASIA BLD QL SMEAR: SLIGHT — SIGNIFICANT CHANGE UP
POTASSIUM SERPL-MCNC: 3.4 MMOL/L — LOW (ref 3.5–5.3)
POTASSIUM SERPL-MCNC: 3.8 MMOL/L — SIGNIFICANT CHANGE UP (ref 3.5–5.3)
POTASSIUM SERPL-SCNC: 3.4 MMOL/L — LOW (ref 3.5–5.3)
POTASSIUM SERPL-SCNC: 3.8 MMOL/L — SIGNIFICANT CHANGE UP (ref 3.5–5.3)
PROT ?TM UR-MCNC: 29 MG/DL — HIGH (ref 0–12)
PROT ?TM UR-MCNC: 29 MG/DL — HIGH (ref 0–12)
PROT SERPL-MCNC: 5.2 G/DL — LOW (ref 6–8.3)
PROT SERPL-MCNC: 6.8 G/DL — SIGNIFICANT CHANGE UP (ref 6–8.3)
PROT UR-MCNC: ABNORMAL
PROT/CREAT UR-RTO: 0.3 RATIO — HIGH (ref 0–0.2)
PROTHROM AB SERPL-ACNC: 10.7 SEC — SIGNIFICANT CHANGE UP (ref 10.6–13.6)
PROTHROM AB SERPL-ACNC: 11.2 SEC — SIGNIFICANT CHANGE UP (ref 10.6–13.6)
RBC # BLD: 3.36 M/UL — LOW (ref 3.8–5.2)
RBC # BLD: 3.61 M/UL — LOW (ref 3.8–5.2)
RBC # BLD: 4.26 M/UL — SIGNIFICANT CHANGE UP (ref 3.8–5.2)
RBC # FLD: 17.1 % — HIGH (ref 10.3–14.5)
RBC # FLD: 17.4 % — HIGH (ref 10.3–14.5)
RBC # FLD: 18.3 % — HIGH (ref 10.3–14.5)
RBC BLD AUTO: ABNORMAL
RBC CASTS # UR COMP ASSIST: 32 /HPF — HIGH (ref 0–4)
SARS-COV-2 IGG+IGM SERPL QL IA: >250 U/ML — HIGH
SARS-COV-2 IGG+IGM SERPL QL IA: POSITIVE
SODIUM SERPL-SCNC: 138 MMOL/L — SIGNIFICANT CHANGE UP (ref 135–145)
SODIUM SERPL-SCNC: 138 MMOL/L — SIGNIFICANT CHANGE UP (ref 135–145)
SP GR SPEC: 1.01 — SIGNIFICANT CHANGE UP (ref 1.01–1.02)
T PALLIDUM AB TITR SER: NEGATIVE — SIGNIFICANT CHANGE UP
URATE SERPL-MCNC: 5.9 MG/DL — SIGNIFICANT CHANGE UP (ref 2.5–7)
UROBILINOGEN FLD QL: NEGATIVE — SIGNIFICANT CHANGE UP
WBC # BLD: 16.27 K/UL — HIGH (ref 3.8–10.5)
WBC # BLD: 18.69 K/UL — HIGH (ref 3.8–10.5)
WBC # BLD: 8.36 K/UL — SIGNIFICANT CHANGE UP (ref 3.8–10.5)
WBC # FLD AUTO: 16.27 K/UL — HIGH (ref 3.8–10.5)
WBC # FLD AUTO: 18.69 K/UL — HIGH (ref 3.8–10.5)
WBC # FLD AUTO: 8.36 K/UL — SIGNIFICANT CHANGE UP (ref 3.8–10.5)
WBC UR QL: 5 /HPF — SIGNIFICANT CHANGE UP (ref 0–5)

## 2021-09-09 PROCEDURE — 88307 TISSUE EXAM BY PATHOLOGIST: CPT | Mod: 26

## 2021-09-09 RX ORDER — ONDANSETRON 8 MG/1
4 TABLET, FILM COATED ORAL EVERY 6 HOURS
Refills: 0 | Status: DISCONTINUED | OUTPATIENT
Start: 2021-09-09 | End: 2021-09-10

## 2021-09-09 RX ORDER — CEFAZOLIN SODIUM 1 G
2000 VIAL (EA) INJECTION ONCE
Refills: 0 | Status: COMPLETED | OUTPATIENT
Start: 2021-09-09 | End: 2021-09-09

## 2021-09-09 RX ORDER — OXYCODONE HYDROCHLORIDE 5 MG/1
5 TABLET ORAL
Refills: 0 | Status: COMPLETED | OUTPATIENT
Start: 2021-09-09 | End: 2021-09-16

## 2021-09-09 RX ORDER — OXYTOCIN 10 UNIT/ML
333.33 VIAL (ML) INJECTION
Qty: 20 | Refills: 0 | Status: DISCONTINUED | OUTPATIENT
Start: 2021-09-09 | End: 2021-09-13

## 2021-09-09 RX ORDER — LANOLIN
1 OINTMENT (GRAM) TOPICAL EVERY 6 HOURS
Refills: 0 | Status: DISCONTINUED | OUTPATIENT
Start: 2021-09-09 | End: 2021-09-13

## 2021-09-09 RX ORDER — FAMOTIDINE 10 MG/ML
20 INJECTION INTRAVENOUS ONCE
Refills: 0 | Status: COMPLETED | OUTPATIENT
Start: 2021-09-09 | End: 2021-09-09

## 2021-09-09 RX ORDER — ACETAMINOPHEN 500 MG
1000 TABLET ORAL ONCE
Refills: 0 | Status: COMPLETED | OUTPATIENT
Start: 2021-09-09 | End: 2021-09-09

## 2021-09-09 RX ORDER — INFLUENZA VIRUS VACCINE 15; 15; 15; 15 UG/.5ML; UG/.5ML; UG/.5ML; UG/.5ML
0.5 SUSPENSION INTRAMUSCULAR ONCE
Refills: 0 | Status: COMPLETED | OUTPATIENT
Start: 2021-09-09 | End: 2021-09-12

## 2021-09-09 RX ORDER — MAGNESIUM SULFATE 500 MG/ML
2 VIAL (ML) INJECTION
Qty: 40 | Refills: 0 | Status: DISCONTINUED | OUTPATIENT
Start: 2021-09-09 | End: 2021-09-09

## 2021-09-09 RX ORDER — MAGNESIUM HYDROXIDE 400 MG/1
30 TABLET, CHEWABLE ORAL
Refills: 0 | Status: DISCONTINUED | OUTPATIENT
Start: 2021-09-09 | End: 2021-09-13

## 2021-09-09 RX ORDER — CITRIC ACID/SODIUM CITRATE 300-500 MG
15 SOLUTION, ORAL ORAL ONCE
Refills: 0 | Status: COMPLETED | OUTPATIENT
Start: 2021-09-09 | End: 2021-09-09

## 2021-09-09 RX ORDER — MORPHINE SULFATE 50 MG/1
0.1 CAPSULE, EXTENDED RELEASE ORAL ONCE
Refills: 0 | Status: DISCONTINUED | OUTPATIENT
Start: 2021-09-09 | End: 2021-09-10

## 2021-09-09 RX ORDER — CHLORHEXIDINE GLUCONATE 213 G/1000ML
1 SOLUTION TOPICAL ONCE
Refills: 0 | Status: COMPLETED | OUTPATIENT
Start: 2021-09-09 | End: 2021-09-09

## 2021-09-09 RX ORDER — IBUPROFEN 200 MG
600 TABLET ORAL EVERY 6 HOURS
Refills: 0 | Status: COMPLETED | OUTPATIENT
Start: 2021-09-09 | End: 2022-08-08

## 2021-09-09 RX ORDER — LABETALOL HCL 100 MG
40 TABLET ORAL ONCE
Refills: 0 | Status: COMPLETED | OUTPATIENT
Start: 2021-09-09 | End: 2021-09-09

## 2021-09-09 RX ORDER — LABETALOL HCL 100 MG
80 TABLET ORAL ONCE
Refills: 0 | Status: COMPLETED | OUTPATIENT
Start: 2021-09-09 | End: 2021-09-09

## 2021-09-09 RX ORDER — HYDRALAZINE HCL 50 MG
5 TABLET ORAL ONCE
Refills: 0 | Status: COMPLETED | OUTPATIENT
Start: 2021-09-09 | End: 2021-09-09

## 2021-09-09 RX ORDER — HEPARIN SODIUM 5000 [USP'U]/ML
5000 INJECTION INTRAVENOUS; SUBCUTANEOUS EVERY 12 HOURS
Refills: 0 | Status: DISCONTINUED | OUTPATIENT
Start: 2021-09-09 | End: 2021-09-13

## 2021-09-09 RX ORDER — SODIUM CHLORIDE 9 MG/ML
1000 INJECTION, SOLUTION INTRAVENOUS
Refills: 0 | Status: DISCONTINUED | OUTPATIENT
Start: 2021-09-09 | End: 2021-09-10

## 2021-09-09 RX ORDER — OXYCODONE HYDROCHLORIDE 5 MG/1
5 TABLET ORAL ONCE
Refills: 0 | Status: DISCONTINUED | OUTPATIENT
Start: 2021-09-09 | End: 2021-09-13

## 2021-09-09 RX ORDER — TETANUS TOXOID, REDUCED DIPHTHERIA TOXOID AND ACELLULAR PERTUSSIS VACCINE, ADSORBED 5; 2.5; 8; 8; 2.5 [IU]/.5ML; [IU]/.5ML; UG/.5ML; UG/.5ML; UG/.5ML
0.5 SUSPENSION INTRAMUSCULAR ONCE
Refills: 0 | Status: DISCONTINUED | OUTPATIENT
Start: 2021-09-09 | End: 2021-09-13

## 2021-09-09 RX ORDER — ACETAMINOPHEN 500 MG
975 TABLET ORAL
Refills: 0 | Status: DISCONTINUED | OUTPATIENT
Start: 2021-09-09 | End: 2021-09-13

## 2021-09-09 RX ORDER — LABETALOL HCL 100 MG
20 TABLET ORAL ONCE
Refills: 0 | Status: COMPLETED | OUTPATIENT
Start: 2021-09-09 | End: 2021-09-09

## 2021-09-09 RX ORDER — MAGNESIUM SULFATE 500 MG/ML
4 VIAL (ML) INJECTION ONCE
Refills: 0 | Status: COMPLETED | OUTPATIENT
Start: 2021-09-09 | End: 2021-09-09

## 2021-09-09 RX ORDER — NALOXONE HYDROCHLORIDE 4 MG/.1ML
0.1 SPRAY NASAL
Refills: 0 | Status: DISCONTINUED | OUTPATIENT
Start: 2021-09-09 | End: 2021-09-10

## 2021-09-09 RX ORDER — DIPHENHYDRAMINE HCL 50 MG
25 CAPSULE ORAL EVERY 6 HOURS
Refills: 0 | Status: DISCONTINUED | OUTPATIENT
Start: 2021-09-09 | End: 2021-09-13

## 2021-09-09 RX ORDER — MAGNESIUM SULFATE 500 MG/ML
2 VIAL (ML) INJECTION
Qty: 40 | Refills: 0 | Status: DISCONTINUED | OUTPATIENT
Start: 2021-09-09 | End: 2021-09-10

## 2021-09-09 RX ORDER — SIMETHICONE 80 MG/1
80 TABLET, CHEWABLE ORAL EVERY 4 HOURS
Refills: 0 | Status: DISCONTINUED | OUTPATIENT
Start: 2021-09-09 | End: 2021-09-13

## 2021-09-09 RX ORDER — NALBUPHINE HYDROCHLORIDE 10 MG/ML
2.5 INJECTION, SOLUTION INTRAMUSCULAR; INTRAVENOUS; SUBCUTANEOUS EVERY 6 HOURS
Refills: 0 | Status: DISCONTINUED | OUTPATIENT
Start: 2021-09-09 | End: 2021-09-10

## 2021-09-09 RX ORDER — DEXAMETHASONE 0.5 MG/5ML
4 ELIXIR ORAL EVERY 6 HOURS
Refills: 0 | Status: DISCONTINUED | OUTPATIENT
Start: 2021-09-09 | End: 2021-09-10

## 2021-09-09 RX ORDER — OXYTOCIN 10 UNIT/ML
333.33 VIAL (ML) INJECTION
Qty: 20 | Refills: 0 | Status: COMPLETED | OUTPATIENT
Start: 2021-09-09 | End: 2021-09-09

## 2021-09-09 RX ADMIN — Medication 300 GRAM(S): at 10:00

## 2021-09-09 RX ADMIN — Medication 100 MILLIGRAM(S): at 10:58

## 2021-09-09 RX ADMIN — Medication 975 MILLIGRAM(S): at 21:44

## 2021-09-09 RX ADMIN — Medication 40 MILLIGRAM(S): at 09:21

## 2021-09-09 RX ADMIN — Medication 20 MILLIGRAM(S): at 08:51

## 2021-09-09 RX ADMIN — Medication 4 MILLIGRAM(S): at 10:59

## 2021-09-09 RX ADMIN — Medication 975 MILLIGRAM(S): at 22:47

## 2021-09-09 RX ADMIN — Medication 400 MILLIGRAM(S): at 14:06

## 2021-09-09 RX ADMIN — Medication 80 MILLIGRAM(S): at 09:47

## 2021-09-09 RX ADMIN — HEPARIN SODIUM 5000 UNIT(S): 5000 INJECTION INTRAVENOUS; SUBCUTANEOUS at 21:44

## 2021-09-09 RX ADMIN — CHLORHEXIDINE GLUCONATE 1 APPLICATION(S): 213 SOLUTION TOPICAL at 08:56

## 2021-09-09 RX ADMIN — Medication 50 GM/HR: at 13:41

## 2021-09-09 RX ADMIN — Medication 5 MILLIGRAM(S): at 10:07

## 2021-09-09 RX ADMIN — Medication 15 MILLILITER(S): at 08:55

## 2021-09-09 RX ADMIN — Medication 1000 MILLIUNIT(S)/MIN: at 13:42

## 2021-09-09 RX ADMIN — FAMOTIDINE 20 MILLIGRAM(S): 10 INJECTION INTRAVENOUS at 08:55

## 2021-09-09 NOTE — OB NEONATOLOGY/PEDIATRICIAN DELIVERY SUMMARY - NSPEDSNEONOTESA_OBGYN_ALL_OB_FT
Requested to attend [facility] CS delivery due to twin gestation.  Mother is a  37yo  at 38weeks of gestation. Prenatal labs A+, negative/NR/immune. GBS negative from . Maternal PMHx: unremarkable. Prenatal Care complicated by GDM diet controlled, preeclampsia, Labetalol X 3 on admission, Hydralazine X1 and started on Mag at 10AM.  ROM at delivery with clear fluid. Delivery by CS, Vertex presentation. Emerged vigorous. Delayed cord clamping for 30seconds. Warmed, dried, stimulated and suctioned. APGAR 8/9 for color.   Mother wants breast and bottle feeding, desires HepB vaccine.  Pediatrician

## 2021-09-09 NOTE — PRE-ANESTHESIA EVALUATION ADULT - NSANTHOSAYNRD_GEN_A_CORE
No. LEANDRA screening performed.  STOP BANG Legend: 0-2 = LOW Risk; 3-4 = INTERMEDIATE Risk; 5-8 = HIGH Risk

## 2021-09-09 NOTE — OB PROVIDER H&P - HISTORY OF PRESENT ILLNESS
36 year old female   @ 38w with Di-Di twin gestation, with history of gestational diabetes dx during second trimester. She is scheduled for Primary  for DI/DI twins on 21 (EDC:  21; ).  She denies recent known COVID exposure, and recent illness. She is having swelling bilateral legs/feet. reports + FM.  She denies headaches and checks her BP frequently at home and has not had any elevations.  She denies s/s's of active labor. denies vaginal bleeding or LOF. denies dizziness, SOB, CP or palpitations. denies HA, vision change, n/v or RUQ pain. Baby A: 4sga9lm, Baby B 5lb 13 oz.     COVID swab- 21  COVID vaccine- Phizer 3/16/21, 21     POBHx: Di-Di twin from IUI  PGYNHx: s/p D&C for uterine polyps   PMHx; GDMA1  PSHx: breast reduction, D&C  ALL: none  SH: denies t/e/d during pregnancy  Psych Hx: generalized anxiety   Meds: prenatal, iron, ASA

## 2021-09-09 NOTE — OB RN PATIENT PROFILE - NS TRANSFER PATIENT BELONGINGS
"The ABCs of the Annual Wellness Visit  Initial Medicare Wellness Visit    Chief Complaint   Patient presents with   • Medicare Wellness-Initial Visit     Discuss Coreg. Does not know if she is suppose to be taking this.        Subjective   History of Present Illness:  Mitzi Villafuerte is a 77 y.o. female who presents for an Initial Medicare Wellness Visit. Patient reports that she wanted to discuss today getting off blood pressure medicine. She had discontinued self for 2 days. She reports tachycardia, but blood pressure unchanged from home readings. Patient was started on bp medicine after chemotherapy due to cardiogenic affects lead to cardiomyopathy. Patient reports last Echo completed showed improvement of LVEF%. I do not have this report to review and unable to determine appropriateness without repeat echocardiogram, patient is unwilling to complete. She reports no side effects with blood pressure medicine, but wanted to \" just take less medicine if possible\".     She is not agreeable to vaccinations. She declines COVID, shingles, and TDAP at visit today. She is following COVID PPE and social distancing. She is agreeable to completing Mammogram and DXA bone scan. No history of osteoporosis.     She denies acute complaints today.     HEALTH RISK ASSESSMENT    Recent Hospitalizations:  No hospitalization(s) within the last year.    Current Medical Providers:  Patient Care Team:  Elio Mao MD as PCP - General (Internal Medicine)    Smoking Status:  Social History     Tobacco Use   Smoking Status Former Smoker   Smokeless Tobacco Never Used   Tobacco Comment    quit 55 years ago       Alcohol Consumption:  Social History     Substance and Sexual Activity   Alcohol Use No       Depression Screen:   PHQ-2/PHQ-9 Depression Screening 3/8/2021   Little interest or pleasure in doing things 0   Feeling down, depressed, or hopeless 0   Total Score 0       Fall Risk Screen:  IRASEMAADI Fall Risk Assessment was completed, " and patient is at LOW risk for falls.Assessment completed on:3/8/2021    Health Habits and Functional and Cognitive Screening:  Functional & Cognitive Status 3/8/2021   Do you have difficulty preparing food and eating? No   Do you have difficulty bathing yourself, getting dressed or grooming yourself? No   Do you have difficulty using the toilet? No   Do you have difficulty moving around from place to place? No   Do you have trouble with steps or getting out of a bed or a chair? No   Current Diet Well Balanced Diet   Dental Exam Up to date   Eye Exam Not up to date   Exercise (times per week) 3 times per week   Current Exercise Activities Include Walking   Do you need help using the phone?  No   Are you deaf or do you have serious difficulty hearing?  No   Do you need help with transportation? No   Do you need help shopping? No   Do you need help preparing meals?  No   Do you need help with housework?  No   Do you need help with laundry? No   Do you need help taking your medications? No   Do you need help managing money? No   Do you ever drive or ride in a car without wearing a seat belt? No   Have you felt unusual stress, anger or loneliness in the last month? No   Who do you live with? Alone   If you need help, do you have trouble finding someone available to you? No   Have you been bothered in the last four weeks by sexual problems? No   Do you have difficulty concentrating, remembering or making decisions? No         Does the patient have evidence of cognitive impairment? No    Asprin use counseling:Taking ASA appropriately as indicated    Age-appropriate Screening Schedule:  Refer to the list below for future screening recommendations based on patient's age, sex and/or medical conditions. Orders for these recommended tests are listed in the plan section. The patient has been provided with a written plan.    Health Maintenance   Topic Date Due   • DXA SCAN  Never done   • LIPID PANEL  Never done   • TDAP/TD  VACCINES (1 - Tdap) 03/08/2021 (Originally 11/6/1962)   • ZOSTER VACCINE (1 of 2) 03/08/2021 (Originally 11/6/1995)   • COLONOSCOPY  02/28/2022 (Originally 1943)   • INFLUENZA VACCINE  03/08/2022 (Originally 8/1/2020)          The following portions of the patient's history were reviewed and updated as appropriate: allergies, current medications, past family history, past medical history, past social history, past surgical history and problem list.    Outpatient Medications Prior to Visit   Medication Sig Dispense Refill   • Cholecalciferol (VITAMIN D3) 50 MCG (2000 UT) capsule Take 1 capsule by mouth Daily.     • escitalopram (LEXAPRO) 10 MG tablet TAKE 1 TABLET BY MOUTH EVERY DAY 90 tablet 3   • lisinopril (PRINIVIL,ZESTRIL) 5 MG tablet TAKE 1 TABLET BY MOUTH EVERY DAY 90 tablet 3   • oxyCODONE (ROXICODONE) 15 MG immediate release tablet Take 1 mg by mouth 4 (Four) Times a Day As Needed.     • pregabalin (Lyrica) 50 MG capsule Take 1 capsule by mouth Daily.     • vitamin B-6 (PYRIDOXINE) 50 MG tablet Take 50 mg by mouth Daily.     • zolpidem (AMBIEN) 10 MG tablet TAKE 1/2 TABLET BY MOUTH EVERY DAY AT BEDTIME 15 tablet 5   • carvedilol (Coreg) 12.5 MG tablet Take 1 tablet by mouth Daily.       No facility-administered medications prior to visit.       Patient Active Problem List   Diagnosis   • Closed displaced comminuted fracture of shaft of left femur (CMS/HCC)   • Closed displaced fracture of shaft of third metacarpal bone of left hand   • B-cell lymphoma (CMS/HCC)   • Cardiomyopathy (CMS/HCC)   • Chronic low back pain with left-sided sciatica   • Chronic pain disorder   • Chronic pain due to neoplasm   • Hip pain   • Hypertension, benign   • Lung mass   • Mixed hyperlipidemia   • Primary insomnia   • Vitamin D deficiency       Advanced Care Planning:  ACP discussion was held with the patient during this visit. Patient has an advance directive in EMR which is still valid.     Review of Systems  "  Constitutional: Negative for activity change, appetite change, fatigue, fever and unexpected weight change.   HENT: Negative for congestion, ear discharge, ear pain, hearing loss, postnasal drip, rhinorrhea, sinus pressure, tinnitus, trouble swallowing and voice change.    Eyes: Negative for discharge and visual disturbance.   Respiratory: Negative for apnea, cough and shortness of breath.    Cardiovascular: Negative for chest pain, palpitations and leg swelling.   Gastrointestinal: Negative for abdominal pain, blood in stool, constipation and rectal pain.   Endocrine: Negative for cold intolerance, heat intolerance, polydipsia and polyphagia.   Genitourinary: Negative for decreased urine volume, difficulty urinating, frequency, hematuria and urgency.   Musculoskeletal: Negative for arthralgias, back pain, myalgias, neck pain and neck stiffness.   Skin: Negative for color change, rash and wound.   Allergic/Immunologic: Negative for environmental allergies.   Neurological: Negative for dizziness, speech difficulty, weakness, numbness and headaches.   Hematological: Negative for adenopathy. Does not bruise/bleed easily.   Psychiatric/Behavioral: Negative for agitation, confusion, decreased concentration and sleep disturbance. The patient is not nervous/anxious.        Compared to one year ago, the patient feels her physical health is the same.  Compared to one year ago, the patient feels her mental health is the same.    Reviewed chart for potential of high risk medication in the elderly: yes  Reviewed chart for potential of harmful drug interactions in the elderly:yes    Objective         Vitals:    03/08/21 0901   BP: 136/70   Pulse: 107   Temp: 97.7 °F (36.5 °C)   SpO2: 98%   Weight: 52.2 kg (115 lb)   Height: 157.5 cm (62\")   PainSc: 0-No pain       Body mass index is 21.03 kg/m².  Discussed the patient's BMI with her. The BMI is in the acceptable range.    Physical Exam  Vitals and nursing note reviewed. "   Constitutional:       Appearance: Normal appearance. She is well-developed and normal weight.   HENT:      Head: Normocephalic and atraumatic.      Right Ear: Hearing, tympanic membrane, ear canal and external ear normal.      Left Ear: Hearing, tympanic membrane, ear canal and external ear normal.      Nose: Nose normal.      Mouth/Throat:      Lips: Pink.      Mouth: Mucous membranes are moist.      Pharynx: Oropharynx is clear. Uvula midline.   Eyes:      General: Lids are normal. Lids are everted, no foreign bodies appreciated. Vision grossly intact.      Extraocular Movements: Extraocular movements intact.      Conjunctiva/sclera: Conjunctivae normal.      Pupils: Pupils are equal, round, and reactive to light.   Neck:      Thyroid: No thyromegaly.   Cardiovascular:      Rate and Rhythm: Normal rate and regular rhythm.      Pulses: Normal pulses.      Heart sounds: Normal heart sounds.   Pulmonary:      Effort: Pulmonary effort is normal.      Breath sounds: Normal breath sounds.   Abdominal:      General: Bowel sounds are normal.      Palpations: Abdomen is soft.      Tenderness: There is no abdominal tenderness.   Musculoskeletal:      Cervical back: Normal range of motion and neck supple.      Right hip: No tenderness.      Left hip: Tenderness present.      Right lower leg: No edema.      Left lower leg: No edema.   Lymphadenopathy:      Head:      Right side of head: No submental, submandibular, tonsillar, preauricular, posterior auricular or occipital adenopathy.      Left side of head: No submental, submandibular, tonsillar, preauricular, posterior auricular or occipital adenopathy.      Cervical: No cervical adenopathy.   Skin:     General: Skin is warm and dry.      Capillary Refill: Capillary refill takes less than 2 seconds.   Neurological:      General: No focal deficit present.      Mental Status: She is alert and oriented to person, place, and time.   Psychiatric:         Attention and  Perception: Attention normal.         Mood and Affect: Mood normal.         Speech: Speech normal.         Behavior: Behavior normal. Behavior is cooperative.         Thought Content: Thought content normal.             Assessment/Plan   Medicare Risks and Personalized Health Plan  CMS Preventative Services Quick Reference  Breast Cancer/Mammogram Screening  Cardiovascular risk  Chronic Pain   Colon Cancer Screening  Dementia/Memory   Depression/Dysphoria  Diabetic Lab Screening   Fall Risk  Glaucoma Risk  Hearing Problem  Immunizations Discussed/Encouraged (specific immunizations; Td, adacel Tdap and Shingrix )  Inadequate Social Support, Isolation, Loneliness, Lack of Transportation, Financial Difficulties, or Caregiver Stress   Inactivity/Sedentary  Obesity/Overweight   Osteoprorosis Risk  Polypharmacy    The above risks/problems have been discussed with the patient.  Pertinent information has been shared with the patient in the After Visit Summary.  Follow up plans and orders are seen below in the Assessment/Plan Section.    Diagnoses and all orders for this visit:    1. Medicare annual wellness visit, initial (Primary)    2. Postmenopause  -     DEXA Bone Density Axial; Future    3. Encounter for screening mammogram for malignant neoplasm of breast  -     Mammo Screening Digital Tomosynthesis Bilateral With CAD; Future    4. Primary insomnia      Follow Up:  Return in about 6 months (around 9/8/2021) for HTN- Dr newby prefer. Continue on current medical therapies at this time. Will send order for mammogram and dexa bone scan to [facility] per patient preference. Patient not fasting at appointment today will return to complete labs later this week. Declined on vaccinations. Patient appropriately taking Ambien as needed for moderate to severe insomnia symptoms. JOSE reviewed at last refill and appropriate for use as prescribed. Patient verbalizes understanding the risk of continuing this type of medicine and  would like to continue related to significant benefit in her sleep. She had tried numerous other therapies without benefit.     An After Visit Summary and PPPS were given to the patient.            None

## 2021-09-09 NOTE — OB RN DELIVERY SUMMARY - NS_TIMERUPTUREDADMITTED_OBGYN_ALL_OB_FT
Eduardo Singh is a 75 year old male seen November 1, 2019 at [facility] where he has resided for 1 month (admit 9/2019) seen for initial visit.   He is seen in his room, ambulatory without device.   States he feels well, pleasant and smiling.   Significant impairment of his verbal skills, but says no to any pain or discomfort.     By chart review, patient has had progressive cognitive decline over the past 8 years, particularly with language.   He was seen by Neurology in 10/2013, noted to have a + family history of Alzheimer's dementia in first degree relatives    Pt thought to have early onset Alzheimer's dementia vs frontotemporal dementia and started on donepezil.   He had gradual progression until this past year, when he had more rapid changes and also developed agitation    He was started on quetiapine by his Neurologist    He initially admitted to [facility] for a respite stay, but wife has decided to have patient stay long term.  He had some trouble with transition at which time he was more paranoid and combative, and quetiapine dose was increased.    He goes to an Adult Day program, [facility] in [place], twice a week.       Past Medical History:   Diagnosis Date     Alzheimer disease (H)      Diabetes (H)      Erectile dysfunction      Hyperlipidemia      Hypertension      Impaired fasting glucose      Past Surgical History:   Procedure Laterality Date     CERVICAL LAMINECTOMY       SH:  Previously lived with his wife in [place]. They have 3 children.   Pt is retired from work as an  and controller.    ROS: unable to obtain secondary to aphasia.      EXAM:  NAD  /70   Pulse 62   Temp 98.2  F (36.8  C)   Resp 18   Wt 78.9 kg (174 lb)    Neck supple without adenopathy  Lungs clear bilaterally with fair air movement  Heart RRR s1s2  Abd soft, NT, no distention, +BS  Ext without edema  Neuro: +aphasia, steady gait  Psych: affect okay, smiling    Last Comprehensive  Metabolic Panel:  Sodium   Date Value Ref Range Status   10/01/2019 132 (A) 136 - 145 mmol/L Final     Potassium   Date Value Ref Range Status   10/01/2019 3.8 3.5 - 5.0 mmol/L Final     Chloride   Date Value Ref Range Status   10/01/2019 94 (A) 98 - 107 mmol/L Final     Carbon Dioxide   Date Value Ref Range Status   10/01/2019 28 22 - 31 mmol/L Final     Anion Gap   Date Value Ref Range Status   10/01/2019 10 5 - 18 mmol/L Final     Glucose   Date Value Ref Range Status   10/01/2019 118 70 - 125 mg/dL Final     Urea Nitrogen   Date Value Ref Range Status   10/01/2019 19 8 - 28 mg/dL Final     Creatinine   Date Value Ref Range Status   10/01/2019 1.21 0.70 - 1.30 mg/dL Final     GFR Estimate   Date Value Ref Range Status   10/01/2019 59 (L) >60 ml/min/1.73m2 Final     Calcium   Date Value Ref Range Status   10/01/2019 9.6 8.5 - 10.5 mg/dL Final     Lab Results   Component Value Date    AST 24 10/01/2019     Lab Results   Component Value Date    ALT 21 10/01/2019     Lab Results   Component Value Date    WBC 9.1 10/01/2019      HGB 14.4 10/01/2019      MCV 92 10/01/2019       10/01/2019     Lab Results   Component Value Date    CHOL 170 10/01/2019     Lab Results   Component Value Date    HDL 55 10/01/2019     Lab Results   Component Value Date    LDL 85 10/01/2019     Lab Results   Component Value Date    TRIG 150 10/01/2019       IMP/PLAN:     (I10) Essential hypertension    Comment:   BP Readings from Last 3 Encounters:   11/01/19 126/70   10/09/19 (!) 159/81   09/27/19 128/84      Pulse Readings from Last 4 Encounters:   11/01/19 62   10/09/19 70   09/27/19 64      Plan: lisinopril 20 mg/day, atenolol 50 mg daily and hydrochlorothiazide 25 mg/day    Will need to monitor for bradycardia on both atenolol and donepezil, and follow Na on hydrochlorothiazide.   Would look to decreasing dose if Na falls further.       (E11.22,  N18.3) Type 2 diabetes mellitus with stage 3 chronic kidney disease, without  long-term current use of insulin (H)  Comment:   Lab Results   Component Value Date    A1C 6.4 10/01/2019   Plan: diet-controlled.     He is on an ACEI and statin.        (F22) Paranoia (H)  Comment: with agitation and combativeness related to his worsening dementia    Plan: quetiapine 25 mg tid.  May be able to try GDR once he is settled.        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   He has been on donepezil >5 years, currently on 10 mg/day.    Monitor for bradycardia / GI symptoms.       Claudia Pascual MD    0 Hour(s) 1 Minute(s)

## 2021-09-09 NOTE — PRE-ANESTHESIA EVALUATION ADULT - NSANTHPMHFT_GEN_ALL_CORE
36 year old female with history of gestational diabetes dx during second trimester presents today for Primary  for DI/DI twins on 21 (EDC:  21; ).  She denies recent known COVID exposure, and recent illness.  She is having swelling bilateral legs/feet.  She is having elevated BPs in the severe range in pre-op so PEC labs have been sent.

## 2021-09-09 NOTE — OB PROVIDER H&P - ATTENDING COMMENTS
Pt admitted at 38 weeks for c/s for twins, noted on admission to have severe range BP, requiring labetalol push and Mag tx. Pt relatively asymptomatic, nl labs. Will proceed with c/s as scheduled.

## 2021-09-09 NOTE — OB RN DELIVERY SUMMARY - NSSELHIDDEN_OBGYN_ALL_OB_FT
[NS_DeliveryAttending1_OBGYN_ALL_OB_FT:[id number]==],[NS_DeliveryAssist1_OBGYN_ALL_OB_FT:[id number]=],[NS_DeliveryRN_OBGYN_ALL_OB_FT:[id number]=],[NS_CirculateRN2_OBGYN_ALL_OB_FT:[id number]==],[NS_DeliveryAttending2_OBGYN_ALL_OB_FT:[id number]=],[NS_DeliveryAssist2_OBGYN_ALL_OB_FT:[id number]=]

## 2021-09-09 NOTE — OB RN PATIENT PROFILE - NSICDXPASTMEDICALHX_GEN_ALL_CORE_FT
PAST MEDICAL HISTORY:  Anemia currently on iron    Gestational diabetes managed through diet; no insulin

## 2021-09-09 NOTE — OB RN INTRAOPERATIVE NOTE - NSSELHIDDEN_OBGYN_ALL_OB_FT
[NS_DeliveryAttending1_OBGYN_ALL_OB_FT:[id number]==],[NS_DeliveryAssist1_OBGYN_ALL_OB_FT:[id number]=],[NS_DeliveryRN_OBGYN_ALL_OB_FT:[id number]=],[NS_CirculateRN2_OBGYN_ALL_OB_FT:[id number]==] [NS_DeliveryAttending1_OBGYN_ALL_OB_FT:[id number]==],[NS_DeliveryAssist1_OBGYN_ALL_OB_FT:[id number]=],[NS_DeliveryRN_OBGYN_ALL_OB_FT:[id number]=],[NS_CirculateRN2_OBGYN_ALL_OB_FT:[id number]==],[NS_DeliveryAttending2_OBGYN_ALL_OB_FT:[id number]=] [NS_DeliveryAttending1_OBGYN_ALL_OB_FT:[id number]==],[NS_DeliveryAssist1_OBGYN_ALL_OB_FT:[id number]=],[NS_DeliveryRN_OBGYN_ALL_OB_FT:[id number]=],[NS_CirculateRN2_OBGYN_ALL_OB_FT:[id number]==],[NS_DeliveryAttending2_OBGYN_ALL_OB_FT:[id number]=],[NS_DeliveryAssist2_OBGYN_ALL_OB_FT:[id number]=]

## 2021-09-09 NOTE — OB RN DELIVERY SUMMARY - NS_SEPSISRSKCALC_OBGYN_ALL_OB_FT
EOS calculated successfully. EOS Risk Factor: 0.5/1000 live births ([facility] national incidence); GA=38w;Temp=97.5; ROM=0.017; GBS='Negative'; Antibiotics='No antibiotics or any antibiotics < 2 hrs prior to birth'

## 2021-09-09 NOTE — OB PROVIDER H&P - NSHPPHYSICALEXAM_GEN_ALL_CORE
Gen: NAD, A&O x 4  Pulm: non-labor breathing  Heart: RRR  Abd: soft, gravid. NT  Ext: skin warm to touch

## 2021-09-09 NOTE — OB PROVIDER DELIVERY SUMMARY - NSPROVIDERDELIVERYNOTE_OBGYN_ALL_OB_FT
Procedure: pLTCS  Preop Dx: di-di twins, cephalic/breech  QBL:3200cc  IVF:  3200mL crystalloids  UOP: 450ml  Layers of uterine closure: one  Complications: bladder flap hematoma  Specimen: placenta   Findings: grossly normal uterus, fallopian tubes, bilaterally enlarged ovaries, bladder flap hematoma briskly bleeding, non-expanding  Hemostatic/intraoperative agents: mercedes & fibrillar  Baby: Baby A Female vertex 6#11, Baby B Female breech 6#9    Nallely Mcguire, PGY2 Procedure: pLTCS  Preop Dx: di-di twins, cephalic/breech  QBL:3200cc  IVF:  3200mL crystalloids  UOP: 450ml  Layers of uterine closure: one  Complications: bladder flap hematoma  Specimen: placenta x2 for twins  Findings: grossly normal uterus, fallopian tubes, bilaterally enlarged ovaries, bladder flap hematoma briskly bleeding, Hemostasis achieved with interrupted sutures/figure 8 in ANNA MARIE. non-expanding  Hemostatic/intraoperative agents: mercedes & fibrillar  Baby: Baby A Female vertex 6#11, Baby B Female breech 6#9  2 uprbc given intraop, TXA, pr cytotec    Nallely Mcguire, PGY2  Dr STEW Franco

## 2021-09-09 NOTE — OB NEONATOLOGY/PEDIATRICIAN DELIVERY SUMMARY - NSPEDSNEONOTESB_OBGYN_ALL_OB_FT
Baby girl born at 38 weeks to a 35yo  via primary C/S. Mother is  A+, negative/NR/immune. GBS negative from . Maternal PMHx: unremarkable. Prenatal history significant for GDM diet controlled, preeclampsia, Labetalol X 3 on admission, Hydralazine X1 and started on Mag at 10AM. AROM at delivery with clear fluids. Baby emerged BREECH with good tone and cord clamping was delayed 30 sec. Infant placed on warmer dried and stimulated. CPAP initiated at 12 mins of life due to increased WOB. CPAP 5 21% given for 20 mins. APGARS 8 /9 given. Mom would like to breast and bottle feed.

## 2021-09-09 NOTE — OB PROVIDER H&P - ASSESSMENT
a/p 37 y/o  @ 38w with Di-Di twin here for scheduled elective primary  section     - admit to L&D  - NPO/IVF  - EFM and toco  - Hypertension: 's/110's. asymptomatic. sent HELP labs. will give Labetalol IVP. Continue monitor vitals  - pre-op labs were done   - Anesthesia consult  - for primary CS    Plan of care for Dr. Salvador Nicole PA-C a/p 37 y/o  @ 38w with Di-Di twin here for scheduled elective primary  section     - admit to L&D  - NPO/IVF  - EFM and toco  - Hypertension: 's/110's. asymptomatic. sent HELLP labs. will give Labetalol IVP. Continue monitor vitals  - pre-op labs were done   - Anesthesia consult  - for primary CS    Plan of care for Dr. Salvador Nicole PA-C

## 2021-09-09 NOTE — OB RN PATIENT PROFILE - NS_DATEOFLASTVISIT_OBGYN_ALL_OB_DT
Alert-The patient is alert, awake and responds to voice. The patient is oriented to time, place, and person. The triage nurse is able to obtain subjective information.
Awake/Alert
07-Sep-2021

## 2021-09-09 NOTE — OB PROVIDER DELIVERY SUMMARY - NSSELHIDDEN_OBGYN_ALL_OB_FT
[NS_DeliveryAttending1_OBGYN_ALL_OB_FT:[id number]==],[NS_DeliveryAssist1_OBGYN_ALL_OB_FT:[id number]=],[NS_DeliveryRN_OBGYN_ALL_OB_FT:[id number]=],[NS_CirculateRN2_OBGYN_ALL_OB_FT:[id number]==],[NS_DeliveryAttending2_OBGYN_ALL_OB_FT:[id number]=]

## 2021-09-09 NOTE — OB RN INTRAOPERATIVE NOTE - NS_ADDITIONALPROCINFO1_OBGYN_ALL_OB_FT
QBL =  urine =  EBL as per MD = QBL = 3196ml as per Triton  urine = 450ml out of OR, blue urine  EBL as per MD = 3200 ml    Pt received 2units PRBC in OR, Pt received cytotec 1000mcg rectally per MD request, Pt received TXA per MD request

## 2021-09-09 NOTE — OB RN PATIENT PROFILE - NSICDXFAMILYHX_GEN_ALL_CORE_FT
FAMILY HISTORY:  Father  Still living? Unknown  FH: diabetes mellitus, Age at diagnosis: Age Unknown  FH: heart disease, Age at diagnosis: Age Unknown    Mother  Still living? Unknown  Family history of cervical cancer, Age at diagnosis: Age Unknown  FH: atrial fibrillation, Age at diagnosis: Age Unknown  FH: rheumatoid arthritis, Age at diagnosis: Age Unknown    Grandparent  Still living? Unknown  FH: asthma, Age at diagnosis: Age Unknown  FH: stomach cancer, Age at diagnosis: Age Unknown    Aunt  Still living? Unknown  FH: lung cancer, Age at diagnosis: Age Unknown  FH: skin cancer, Age at diagnosis: Age Unknown    Uncle  Still living? Unknown  FH: prostate cancer, Age at diagnosis: Age Unknown

## 2021-09-10 LAB
ALBUMIN SERPL ELPH-MCNC: 2.3 G/DL — LOW (ref 3.3–5)
ALP SERPL-CCNC: 311 U/L — HIGH (ref 40–120)
ALT FLD-CCNC: 10 U/L — SIGNIFICANT CHANGE UP (ref 10–45)
ANION GAP SERPL CALC-SCNC: 10 MMOL/L — SIGNIFICANT CHANGE UP (ref 5–17)
APTT BLD: 27.4 SEC — LOW (ref 27.5–35.5)
AST SERPL-CCNC: 25 U/L — SIGNIFICANT CHANGE UP (ref 10–40)
BASOPHILS # BLD AUTO: 0.05 K/UL — SIGNIFICANT CHANGE UP (ref 0–0.2)
BASOPHILS NFR BLD AUTO: 0.3 % — SIGNIFICANT CHANGE UP (ref 0–2)
BILIRUB SERPL-MCNC: 0.3 MG/DL — SIGNIFICANT CHANGE UP (ref 0.2–1.2)
BUN SERPL-MCNC: <4 MG/DL — LOW (ref 7–23)
CALCIUM SERPL-MCNC: 7.2 MG/DL — LOW (ref 8.4–10.5)
CHLORIDE SERPL-SCNC: 103 MMOL/L — SIGNIFICANT CHANGE UP (ref 96–108)
CO2 SERPL-SCNC: 20 MMOL/L — LOW (ref 22–31)
CREAT SERPL-MCNC: 0.72 MG/DL — SIGNIFICANT CHANGE UP (ref 0.5–1.3)
EOSINOPHIL # BLD AUTO: 0.02 K/UL — SIGNIFICANT CHANGE UP (ref 0–0.5)
EOSINOPHIL NFR BLD AUTO: 0.1 % — SIGNIFICANT CHANGE UP (ref 0–6)
FIBRINOGEN PPP-MCNC: 598 MG/DL — HIGH (ref 290–520)
GLUCOSE SERPL-MCNC: 119 MG/DL — HIGH (ref 70–99)
HCT VFR BLD CALC: 25.2 % — LOW (ref 34.5–45)
HGB BLD-MCNC: 8 G/DL — LOW (ref 11.5–15.5)
IMM GRANULOCYTES NFR BLD AUTO: 0.8 % — SIGNIFICANT CHANGE UP (ref 0–1.5)
INR BLD: 0.86 RATIO — LOW (ref 0.88–1.16)
LDH SERPL L TO P-CCNC: 346 U/L — HIGH (ref 50–242)
LYMPHOCYTES # BLD AUTO: 1.85 K/UL — SIGNIFICANT CHANGE UP (ref 1–3.3)
LYMPHOCYTES # BLD AUTO: 10.5 % — LOW (ref 13–44)
MAGNESIUM SERPL-MCNC: 4.7 MG/DL — HIGH (ref 1.6–2.6)
MCHC RBC-ENTMCNC: 24 PG — LOW (ref 27–34)
MCHC RBC-ENTMCNC: 31.7 GM/DL — LOW (ref 32–36)
MCV RBC AUTO: 75.4 FL — LOW (ref 80–100)
MONOCYTES # BLD AUTO: 1.15 K/UL — HIGH (ref 0–0.9)
MONOCYTES NFR BLD AUTO: 6.6 % — SIGNIFICANT CHANGE UP (ref 2–14)
NEUTROPHILS # BLD AUTO: 14.33 K/UL — HIGH (ref 1.8–7.4)
NEUTROPHILS NFR BLD AUTO: 81.7 % — HIGH (ref 43–77)
NRBC # BLD: 0 /100 WBCS — SIGNIFICANT CHANGE UP (ref 0–0)
PLATELET # BLD AUTO: 337 K/UL — SIGNIFICANT CHANGE UP (ref 150–400)
POTASSIUM SERPL-MCNC: 4 MMOL/L — SIGNIFICANT CHANGE UP (ref 3.5–5.3)
POTASSIUM SERPL-SCNC: 4 MMOL/L — SIGNIFICANT CHANGE UP (ref 3.5–5.3)
PROT SERPL-MCNC: 5.1 G/DL — LOW (ref 6–8.3)
PROTHROM AB SERPL-ACNC: 10.4 SEC — LOW (ref 10.6–13.6)
RBC # BLD: 3.34 M/UL — LOW (ref 3.8–5.2)
RBC # FLD: 18.2 % — HIGH (ref 10.3–14.5)
SODIUM SERPL-SCNC: 133 MMOL/L — LOW (ref 135–145)
URATE SERPL-MCNC: 5.8 MG/DL — SIGNIFICANT CHANGE UP (ref 2.5–7)
WBC # BLD: 17.54 K/UL — HIGH (ref 3.8–10.5)
WBC # FLD AUTO: 17.54 K/UL — HIGH (ref 3.8–10.5)

## 2021-09-10 RX ORDER — OXYCODONE HYDROCHLORIDE 5 MG/1
5 TABLET ORAL
Refills: 0 | Status: DISCONTINUED | OUTPATIENT
Start: 2021-09-10 | End: 2021-09-13

## 2021-09-10 RX ORDER — OXYCODONE HYDROCHLORIDE 5 MG/1
5 TABLET ORAL ONCE
Refills: 0 | Status: DISCONTINUED | OUTPATIENT
Start: 2021-09-10 | End: 2021-09-11

## 2021-09-10 RX ORDER — SENNA PLUS 8.6 MG/1
2 TABLET ORAL AT BEDTIME
Refills: 0 | Status: DISCONTINUED | OUTPATIENT
Start: 2021-09-10 | End: 2021-09-13

## 2021-09-10 RX ADMIN — SENNA PLUS 2 TABLET(S): 8.6 TABLET ORAL at 21:44

## 2021-09-10 RX ADMIN — HEPARIN SODIUM 5000 UNIT(S): 5000 INJECTION INTRAVENOUS; SUBCUTANEOUS at 10:32

## 2021-09-10 RX ADMIN — Medication 975 MILLIGRAM(S): at 10:20

## 2021-09-10 RX ADMIN — OXYCODONE HYDROCHLORIDE 5 MILLIGRAM(S): 5 TABLET ORAL at 14:34

## 2021-09-10 RX ADMIN — OXYCODONE HYDROCHLORIDE 5 MILLIGRAM(S): 5 TABLET ORAL at 17:54

## 2021-09-10 RX ADMIN — Medication 975 MILLIGRAM(S): at 22:13

## 2021-09-10 RX ADMIN — OXYCODONE HYDROCHLORIDE 5 MILLIGRAM(S): 5 TABLET ORAL at 09:10

## 2021-09-10 RX ADMIN — OXYCODONE HYDROCHLORIDE 5 MILLIGRAM(S): 5 TABLET ORAL at 21:44

## 2021-09-10 RX ADMIN — SIMETHICONE 80 MILLIGRAM(S): 80 TABLET, CHEWABLE ORAL at 18:46

## 2021-09-10 RX ADMIN — Medication 975 MILLIGRAM(S): at 09:48

## 2021-09-10 RX ADMIN — Medication 975 MILLIGRAM(S): at 21:43

## 2021-09-10 RX ADMIN — OXYCODONE HYDROCHLORIDE 5 MILLIGRAM(S): 5 TABLET ORAL at 15:05

## 2021-09-10 RX ADMIN — OXYCODONE HYDROCHLORIDE 5 MILLIGRAM(S): 5 TABLET ORAL at 22:13

## 2021-09-10 RX ADMIN — OXYCODONE HYDROCHLORIDE 5 MILLIGRAM(S): 5 TABLET ORAL at 18:29

## 2021-09-10 RX ADMIN — SIMETHICONE 80 MILLIGRAM(S): 80 TABLET, CHEWABLE ORAL at 14:34

## 2021-09-10 RX ADMIN — Medication 975 MILLIGRAM(S): at 15:45

## 2021-09-10 RX ADMIN — SIMETHICONE 80 MILLIGRAM(S): 80 TABLET, CHEWABLE ORAL at 08:37

## 2021-09-10 RX ADMIN — OXYCODONE HYDROCHLORIDE 5 MILLIGRAM(S): 5 TABLET ORAL at 08:37

## 2021-09-10 RX ADMIN — Medication 975 MILLIGRAM(S): at 02:38

## 2021-09-10 RX ADMIN — Medication 975 MILLIGRAM(S): at 15:15

## 2021-09-10 RX ADMIN — HEPARIN SODIUM 5000 UNIT(S): 5000 INJECTION INTRAVENOUS; SUBCUTANEOUS at 21:44

## 2021-09-10 RX ADMIN — Medication 975 MILLIGRAM(S): at 03:30

## 2021-09-10 NOTE — PROGRESS NOTE ADULT - ATTENDING COMMENTS
pt seen and evaluated    feeling good with no co    galindo dc'd and first void pending    not breast feeding    vitals stable    fundus firm    incision dry intact    lochia light    h/h 8/25.2  stable    plan analgesics as needed    will monitor for any unusual bleeding but presently stable    diet as tolerated    increased ambulation

## 2021-09-10 NOTE — PROGRESS NOTE ADULT - SUBJECTIVE AND OBJECTIVE BOX
OB Progress Note:  Delivery, POD#1    S: 37yo POD#1 s/p LTCS . Her pain is well controlled. She is tolerating a regular diet. Denies N/V. Denies CP/SOB/lightheadedness/dizziness. Pt denies sxs of PEC: denies headache, visual changes, RUQ pain, respiratory distress  She is ambulating without difficulty.   Voiding spontaneously.     O:   Vital Signs Last 24 Hrs  T(C): 36.6 (10 Sep 2021 00:32), Max: 37.1 (09 Sep 2021 20:30)  T(F): 97.9 (10 Sep 2021 00:32), Max: 98.7 (09 Sep 2021 20:30)  HR: 79 (10 Sep 2021 00:32) (70 - 98)  BP: 124/81 (10 Sep 2021 00:32) (102/57 - 181/110)  BP(mean): 97 (09 Sep 2021 17:40) (73 - 112)  RR: 18 (10 Sep 2021 00:32) (13 - 36)  SpO2: 97% (10 Sep 2021 00:32) (94% - 100%)    Labs:  Blood type: A Positive  Rubella IgG: RPR: Negative                          8.9<L>   18.69<H> >-----------< 356    (  @ 17:34 )             27.6<L>                        7.8<L>   16.27<H> >-----------< 361    (  @ 12:34 )             25.5<L>                        9.6<L>   8.36 >-----------< 455<H>    (  @ 09:07 )             30.6<L>    21 @ 17:35      138  |  108  |  4<L>  ----------------------------<  97  3.8   |  18<L>  |  0.68    21 @ 09:07      138  |  105  |  <4<L>  ----------------------------<  81  3.4<L>   |  18<L>  |  0.68        Ca    7.9<L>      09 Sep 2021 17:35  Ca    9.2      09 Sep 2021 09:07  Mg     4.7<H>       Mg     3.7<H>         TPro  5.2<L>  /  Alb  2.4<L>  /  TBili  0.6  /  DBili  x   /  AST  22  /  ALT  9<L>  /  AlkPhos  340<H>  21 @ 17:35  TPro  6.8  /  Alb  3.2<L>  /  TBili  0.5  /  DBili  x   /  AST  21  /  ALT  15  /  AlkPhos  453<H>  21 @ 09:07    PE:  General: NAD  Abdomen: Mildly distended, appropriately tender, incision c/d/i.  Extremities: No erythema, no pitting edema

## 2021-09-10 NOTE — PROGRESS NOTE ADULT - SUBJECTIVE AND OBJECTIVE BOX
Day 1 of Anesthesia Pain Management Service    SUBJECTIVE: Doing ok  Pain Scale Score:          [X] Refer to charted pain scores    THERAPY:    s/p   100  mcg PF morphine on 9\9\2021      MEDICATIONS  (STANDING):  acetaminophen   Tablet .. 975 milliGRAM(s) Oral <User Schedule>  diphtheria/tetanus/pertussis (acellular) Vaccine (ADAcel) 0.5 milliLiter(s) IntraMuscular once  heparin   Injectable 5000 Unit(s) SubCutaneous every 12 hours  ibuprofen  Tablet. 600 milliGRAM(s) Oral every 6 hours  influenza   Vaccine 0.5 milliLiter(s) IntraMuscular once  lactated ringers. 1000 milliLiter(s) (125 mL/Hr) IV Continuous <Continuous>  lactated ringers. 1000 milliLiter(s) (50 mL/Hr) IV Continuous <Continuous>  magnesium sulfate Infusion 2 Gm/Hr (50 mL/Hr) IV Continuous <Continuous>  morphine PF Spinal 0.1 milliGRAM(s) IntraThecal. once  oxytocin Infusion 333.333 milliUNIT(s)/Min (1000 mL/Hr) IV Continuous <Continuous>    MEDICATIONS  (PRN):  dexAMETHasone  Injectable 4 milliGRAM(s) IV Push every 6 hours PRN Nausea  diphenhydrAMINE 25 milliGRAM(s) Oral every 6 hours PRN Pruritus  lanolin Ointment 1 Application(s) Topical every 6 hours PRN Sore Nipples  magnesium hydroxide Suspension 30 milliLiter(s) Oral two times a day PRN Constipation  nalbuphine Injectable 2.5 milliGRAM(s) IV Push every 6 hours PRN Pruritus  naloxone Injectable 0.1 milliGRAM(s) IV Push every 3 minutes PRN For ANY of the following changes in patient status:  A. Breaths Per Minute LESS THAN 10, B. Oxygen saturation LESS THAN 90%, C. Sedation score of 6 for Stop After: 4 Times  ondansetron Injectable 4 milliGRAM(s) IV Push every 6 hours PRN Nausea  oxyCODONE    IR 5 milliGRAM(s) Oral once PRN Moderate to Severe Pain (4-10)  oxyCODONE    IR 5 milliGRAM(s) Oral every 3 hours PRN Moderate to Severe Pain (4-10)  simethicone 80 milliGRAM(s) Chew every 4 hours PRN Gas      OBJECTIVE:    Sedation:        	[X] Alert	 [ ] Drowsy	[ ] Arousable      [ ] Asleep       [ ] Unresponsive    Side Effects:	[X] None 	[ ] Nausea	[ ] Vomiting         [ ] Pruritus  		[ ] Weakness            [ ] Numbness	          [ ] Other:    Vital Signs Last 24 Hrs  T(C): 36.8 (10 Sep 2021 08:23), Max: 37.1 (09 Sep 2021 20:30)  T(F): 98.2 (10 Sep 2021 08:23), Max: 98.7 (09 Sep 2021 20:30)  HR: 86 (10 Sep 2021 08:23) (70 - 98)  BP: 121/80 (10 Sep 2021 08:23) (102/57 - 181/110)  BP(mean): 97 (09 Sep 2021 17:40) (73 - 112)  RR: 18 (10 Sep 2021 08:23) (13 - 36)  SpO2: 97% (10 Sep 2021 08:23) (94% - 100%)    ASSESSMENT/ PLAN  [X] Patient transitioned to prn analgesics  [X] Pain management per primary service, pain service to sign off   [X]Documentation and Verification of current medications

## 2021-09-11 RX ORDER — NIFEDIPINE 30 MG
30 TABLET, EXTENDED RELEASE 24 HR ORAL DAILY
Refills: 0 | Status: DISCONTINUED | OUTPATIENT
Start: 2021-09-11 | End: 2021-09-11

## 2021-09-11 RX ORDER — NIFEDIPINE 30 MG
30 TABLET, EXTENDED RELEASE 24 HR ORAL ONCE
Refills: 0 | Status: COMPLETED | OUTPATIENT
Start: 2021-09-11 | End: 2021-09-11

## 2021-09-11 RX ORDER — NIFEDIPINE 30 MG
60 TABLET, EXTENDED RELEASE 24 HR ORAL DAILY
Refills: 0 | Status: DISCONTINUED | OUTPATIENT
Start: 2021-09-11 | End: 2021-09-13

## 2021-09-11 RX ORDER — NIFEDIPINE 30 MG
10 TABLET, EXTENDED RELEASE 24 HR ORAL ONCE
Refills: 0 | Status: COMPLETED | OUTPATIENT
Start: 2021-09-11 | End: 2021-09-11

## 2021-09-11 RX ADMIN — Medication 975 MILLIGRAM(S): at 15:30

## 2021-09-11 RX ADMIN — OXYCODONE HYDROCHLORIDE 5 MILLIGRAM(S): 5 TABLET ORAL at 04:00

## 2021-09-11 RX ADMIN — OXYCODONE HYDROCHLORIDE 5 MILLIGRAM(S): 5 TABLET ORAL at 03:31

## 2021-09-11 RX ADMIN — HEPARIN SODIUM 5000 UNIT(S): 5000 INJECTION INTRAVENOUS; SUBCUTANEOUS at 21:44

## 2021-09-11 RX ADMIN — OXYCODONE HYDROCHLORIDE 5 MILLIGRAM(S): 5 TABLET ORAL at 08:42

## 2021-09-11 RX ADMIN — SIMETHICONE 80 MILLIGRAM(S): 80 TABLET, CHEWABLE ORAL at 00:39

## 2021-09-11 RX ADMIN — Medication 30 MILLIGRAM(S): at 08:39

## 2021-09-11 RX ADMIN — Medication 10 MILLIGRAM(S): at 22:45

## 2021-09-11 RX ADMIN — OXYCODONE HYDROCHLORIDE 5 MILLIGRAM(S): 5 TABLET ORAL at 20:14

## 2021-09-11 RX ADMIN — Medication 10 MILLIGRAM(S): at 22:06

## 2021-09-11 RX ADMIN — Medication 975 MILLIGRAM(S): at 15:38

## 2021-09-11 RX ADMIN — Medication 975 MILLIGRAM(S): at 08:42

## 2021-09-11 RX ADMIN — OXYCODONE HYDROCHLORIDE 5 MILLIGRAM(S): 5 TABLET ORAL at 19:44

## 2021-09-11 RX ADMIN — OXYCODONE HYDROCHLORIDE 5 MILLIGRAM(S): 5 TABLET ORAL at 01:09

## 2021-09-11 RX ADMIN — Medication 975 MILLIGRAM(S): at 09:30

## 2021-09-11 RX ADMIN — Medication 975 MILLIGRAM(S): at 04:00

## 2021-09-11 RX ADMIN — OXYCODONE HYDROCHLORIDE 5 MILLIGRAM(S): 5 TABLET ORAL at 00:39

## 2021-09-11 RX ADMIN — Medication 975 MILLIGRAM(S): at 22:14

## 2021-09-11 RX ADMIN — SIMETHICONE 80 MILLIGRAM(S): 80 TABLET, CHEWABLE ORAL at 13:10

## 2021-09-11 RX ADMIN — Medication 975 MILLIGRAM(S): at 03:30

## 2021-09-11 RX ADMIN — Medication 30 MILLIGRAM(S): at 14:21

## 2021-09-11 RX ADMIN — Medication 10 MILLIGRAM(S): at 23:14

## 2021-09-11 RX ADMIN — Medication 10 MILLIGRAM(S): at 17:52

## 2021-09-11 RX ADMIN — OXYCODONE HYDROCHLORIDE 5 MILLIGRAM(S): 5 TABLET ORAL at 13:12

## 2021-09-11 RX ADMIN — SENNA PLUS 2 TABLET(S): 8.6 TABLET ORAL at 21:44

## 2021-09-11 RX ADMIN — Medication 975 MILLIGRAM(S): at 21:44

## 2021-09-11 NOTE — PROGRESS NOTE ADULT - SUBJECTIVE AND OBJECTIVE BOX
OB Progress Note: LTCS, POD#2    S: 35yo POD#2 s/p LTCS. Pain is well controlled. She is tolerating a regular diet and passing flatus. She is voiding spontaneously, and ambulating without difficulty. Denies CP/SOB. Denies lightheadedness/dizziness. Denies N/V. Denies sxs od PEC: denies headache, visual changes, RUQ pain, respiratory distress    O:  Vitals:  Vital Signs Last 24 Hrs  T(C): 36.5 (11 Sep 2021 00:44), Max: 37 (10 Sep 2021 14:29)  T(F): 97.7 (11 Sep 2021 00:44), Max: 98.6 (10 Sep 2021 14:29)  HR: 92 (11 Sep 2021 00:44) (76 - 96)  BP: 144/85 (10 Sep 2021 21:08) (118/79 - 144/85)  BP(mean): --  RR: 18 (11 Sep 2021 00:44) (18 - 18)  SpO2: 98% (11 Sep 2021 00:44) (97% - 99%)    MEDICATIONS  (STANDING):  acetaminophen   Tablet .. 975 milliGRAM(s) Oral <User Schedule>  diphtheria/tetanus/pertussis (acellular) Vaccine (ADAcel) 0.5 milliLiter(s) IntraMuscular once  heparin   Injectable 5000 Unit(s) SubCutaneous every 12 hours  ibuprofen  Tablet. 600 milliGRAM(s) Oral every 6 hours  influenza   Vaccine 0.5 milliLiter(s) IntraMuscular once  oxytocin Infusion 333.333 milliUNIT(s)/Min (1000 mL/Hr) IV Continuous <Continuous>  senna 2 Tablet(s) Oral at bedtime      MEDICATIONS  (PRN):  diphenhydrAMINE 25 milliGRAM(s) Oral every 6 hours PRN Pruritus  lanolin Ointment 1 Application(s) Topical every 6 hours PRN Sore Nipples  magnesium hydroxide Suspension 30 milliLiter(s) Oral two times a day PRN Constipation  oxyCODONE    IR 5 milliGRAM(s) Oral once PRN Moderate to Severe Pain (4-10)  oxyCODONE    IR 5 milliGRAM(s) Oral every 3 hours PRN Moderate to Severe Pain (4-10)  oxyCODONE    IR 5 milliGRAM(s) Oral once PRN Moderate to Severe Pain (4-10)  simethicone 80 milliGRAM(s) Chew every 4 hours PRN Gas      Labs:  Blood type: A Positive  Rubella IgG: RPR: Negative                          8.0<L>   17.54<H> >-----------< 337    ( 09-10 @ 05:35 )             25.2<L>                        8.9<L>   18.69<H> >-----------< 356    ( 09-09 @ 17:34 )             27.6<L>                        7.8<L>   16.27<H> >-----------< 361    ( 09-09 @ 12:34 )             25.5<L>                        9.6<L>   8.36 >-----------< 455<H>    ( 09-09 @ 09:07 )             30.6<L>    09-10-21 @ 05:35      133<L>  |  103  |  <4<L>  ----------------------------<  119<H>  4.0   |  20<L>  |  0.72    09-09-21 @ 17:35      138  |  108  |  4<L>  ----------------------------<  97  3.8   |  18<L>  |  0.68    09-09-21 @ 09:07      138  |  105  |  <4<L>  ----------------------------<  81  3.4<L>   |  18<L>  |  0.68        Ca    7.2<L>      10 Sep 2021 05:35  Ca    7.9<L>      09 Sep 2021 17:35  Ca    9.2      09 Sep 2021 09:07  Mg     4.7<H>     09-10  Mg     4.7<H>     09-09  Mg     3.7<H>     09-09    TPro  5.1<L>  /  Alb  2.3<L>  /  TBili  0.3  /  DBili  x   /  AST  25  /  ALT  10  /  AlkPhos  311<H>  09-10-21 @ 05:35  TPro  5.2<L>  /  Alb  2.4<L>  /  TBili  0.6  /  DBili  x   /  AST  22  /  ALT  9<L>  /  AlkPhos  340<H>  09-09-21 @ 17:35  TPro  6.8  /  Alb  3.2<L>  /  TBili  0.5  /  DBili  x   /  AST  21  /  ALT  15  /  AlkPhos  453<H>  09-09-21 @ 09:07      PE:  General: NAD  Abdomen: Soft, appropriately tender, incision c/d/i.  Extremities: No erythema, no pitting edema

## 2021-09-11 NOTE — PROVIDER CONTACT NOTE (CHANGE IN STATUS NOTIFICATION) - ACTION/TREATMENT ORDERED:
will continue to monitor vs and for S&S of PEC Procardia 10 mg IR
procardia 30 mg xl stat then procardia 60 mg xl daily

## 2021-09-11 NOTE — PROVIDER CONTACT NOTE (CHANGE IN STATUS NOTIFICATION) - DATE AND TIME:
"Chief complaint:   Chief Complaint   Patient presents with   â¢ Foot Pain       Vitals:  Visit Vitals  /84   Pulse 65   Temp 98.1 Â°F (36.7 Â°C) (Oral)   Resp 16       HISTORY OF PRESENT ILLNESS     The patient is pleasant 53-year-old male who presents today for chief complaint of right foot pain. His symptoms of been present for almost 2 weeks but the pain has gotten to a level of ""severe\"" in the past 24 hours. The majority of his pain is at the ball of his foot and base of the great toe and base of the second toe. He rates the pain a level 9/10. Prior to this he was experiencing milder pain at the base of the toes. Since yesterday foot has felt hot. It is now faintly red colored as well. He cannot stand to have the sheets touch his foot. His symptoms began during the night. It hurts to walk today. He has a rare degenerative bone disease so was concerned about the possibility of refracture as the cause of his symptoms. He denies any known trauma or injury to the foot. No breaks in the skin or sources for infection. He denies any fever, chills, malaise. The patient has tried Tylenol for his symptoms. He cannot take anti-inflammatory medications because he is a kidney transplant patient.          Other significant problems:  Patient Active Problem List    Diagnosis Date Noted   â¢ Type II or unspecified type diabetes mellitus with renal manifestations, uncontrolled(250.42) (CMS/[facility]) 02/02/2018     Priority: Medium   â¢ Proteinuria 04/04/2017     Priority: Low   â¢ No diabetic retinopathy in both eyes 06/02/2015     Priority: Low   â¢ Constant right exotropia  06/02/2015     Priority: Low   â¢ Myopia of both eyes 06/02/2015     Priority: Low   â¢ Regular astigmatism of left eye 06/02/2015     Priority: Low   â¢ CKD (chronic kidney disease) stage 3, GFR 30-59 ml/min 03/02/2015     Priority: Low   â¢ Type II or unspecified type diabetes mellitus without mention of complication, uncontrolled 09/11/2013     Priority: Low   â¢ "
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     Priority: Low   â¢ Renal osteodystrophy 09/09/2013     Priority: Low   â¢ Kidney replaced by transplant 06/17/2013     Priority: Low   â¢ Essential hypertension, benign 06/17/2013     Priority: Low       PAST MEDICAL, FAMILY AND SOCIAL HISTORY     Medications:  Current Outpatient Prescriptions   Medication   â¢ ranitidine (ZANTAC) 150 MG tablet   â¢ glipiZIDE (GLUCOTROL) 5 MG tablet   â¢ mycophenolate (CELLCEPT) 500 MG tablet   â¢ mycophenolate (CELLCEPT) 250 MG capsule   â¢ terazosin (HYTRIN) 5 MG capsule   â¢ insulin degludec (TRESIBA FLEXTOUCH) 100 UNIT/ML pen-injector   â¢ metoPROLOL (LOPRESSOR) 100 MG tablet   â¢ tacrolimus (PROGRAF) 5 MG capsule   â¢ Omega-3 Fatty Acids (OMEGA 3 PO)   â¢ Cholecalciferol (VITAMIN D3) 5000 UNITS CAPS   â¢ albuterol 108 (90 Base) MCG/ACT inhaler   â¢ BD PEN NEEDLE WAQAR U/F 32G X 4 MM Misc   â¢ ONETOUCH VERIO test strip   â¢ ONETOUCH DELICA LANCETS 23Z [facility]     No current facility-administered medications for this visit.         Allergies:  ALLERGIES:   Allergen Reactions   â¢ Avelox [Moxifloxacin Hcl In Nacl]    â¢ Morphine RASH     Rash and itching   â¢ Codeine        Past Medical  History/Surgeries:  Past Medical History:   Diagnosis Date   â¢ CKD (chronic kidney disease)    â¢ Rand-Danlos disease 38   â¢ Essential hypertension, benign 6/17/2013   â¢ Osteoporosis, unspecified 02/28/2013   â¢ Type 2 diabetes mellitus (CMS/HCC)        Past Surgical History:   Procedure Laterality Date   â¢ Dexa bone density axial skeleton  02/28/2013   â¢ Hip surgery Bilateral 1995   â¢ Ir kidney biopsy  12/13/2017   â¢ Kidney transplant  1996   â¢ Knee surgery Bilateral 1993   â¢
Leg surgery Bilateral 1994    for knock knee with plate   â¢ Leg surgery Left 2000    plate replacement then fracture with plate replacement   â¢ Removal of tonsils,14+ y/o  2005   â¢ Thyroid surgery  1996    Parathyroidectomy. Has half of one left       Family History:  Family History   Problem Relation Age of Onset   â¢ Kidney disease Sister    â¢ Kidney disease Sister    â¢ Diabetes Mother    â¢ Cataracts Mother    â¢ Glaucoma Mother    â¢ Cancer Maternal Grandmother    â¢ Cataracts Maternal Grandfather        Social History:  Social History   Substance Use Topics   â¢ Smoking status: Never Smoker   â¢ Smokeless tobacco: Never Used   â¢ Alcohol use No       REVIEW OF SYSTEMS     Review of Systems patient denies any bruising or foot feeling cold. No associated ankle pain, lower leg pain, lower leg swelling, knee pain    PHYSICAL EXAM     Physical Exam   Constitutional: He is oriented to person, place, and time. He appears well-developed and well-nourished. Cardiovascular: Normal rate. Pulmonary/Chest: Effort normal.   Musculoskeletal:   Right ankle: Normal range motion. No bony tenderness or swelling noted. Right foot: Redness and swelling of the right great toe, right first MCP joint, right second toe. Patient reports tenderness with palpation at the distal first metatarsal, first MCP joint, proximal right great toe, right second toe. Skin temperature is increased in affected area. No break in the skin or obvious source for an infection. Capillary refill time less than 3 seconds. Patient is able to wiggle toes but it does cause discomfort to do so. Neurological: He is alert and oriented to person, place, and time. Skin: There is erythema. No pallor. Nursing note and vitals reviewed. ASSESSMENT/PLAN     Lab: CBC and uric acid level pending. Procedure: X-ray of right foot is negative for acute fracture. Tylenol 1000 mg p.o. administered here. Impression: Right foot pain and swelling.  Considerations
11-Sep-2021 13:05
include gout or inflammatory arthritis    Plan: Take the steroid prednisone 40 mg daily for 5 days    Apply ice for 15-20 minutes at a time to help with pain and swelling    Take Tylenol 1000 mg every 8 hours as needed for pain    Keep right foot elevated as much as possible    Limit any alcohol intake, red meat, organ meats, and other foods listed on the gout handout    Many people find eating cherries or blueberries to be helpful if they have anti-inflammatory properties as well    Limit any walking or ambulation for the next couple days. Wear the brace when walking to help support the foot better. We will contact you in the next 24 hours with your blood test results. If your symptoms are not improving at all in next 2-3 days, please contact your primary care physician or return to urgent care for re-evaluation. Seek medical attention sooner if symptoms worsening. Addendum: CBC shows normal white blood cell count. As an aside the patient is slightly low with his hemoglobin and hematocrit.
11-Sep-2021 00:17

## 2021-09-11 NOTE — PROGRESS NOTE ADULT - SUBJECTIVE AND OBJECTIVE BOX
Postpartum Note,  Section   ATTENDING NOTE Post-operative day 2    Subjective:  The patient feels well.  She is ambulating.   She is tolerating regular diet.  She denies nausea and vomiting.  She is voiding.  Her pain is controlled.  She reports normal postpartum bleeding    Physical exam:    Vital Signs Last 24 Hrs  T(C): 36.5 (11 Sep 2021 07:38), Max: 37 (10 Sep 2021 14:29)  T(F): 97.7 (11 Sep 2021 07:38), Max: 98.6 (10 Sep 2021 14:29)  HR: 99 (11 Sep 2021 07:38) (92 - 99)  BP: 139/87 (11 Sep 2021 07:38) (124/86 - 157/97)  BP(mean): --  RR: 18 (11 Sep 2021 07:38) (18 - 18)  SpO2: 98% (11 Sep 2021 07:38) (98% - 99%)    Gen: NAD  Breast: Soft, nontender, not engorged.  Abdomen: Soft, nontender, no distension , firm uterine fundus at umbilicus.  Incision: Clean, dry, and intact  Pelvic: Normal lochia noted  Ext: No calf tenderness    LABS:                        8.0    17.54 )-----------( 337      ( 10 Sep 2021 05:35 )             25.2                         8.9    18.69 )-----------( 356      ( 09 Sep 2021 17:34 )             27.6                         7.8    16.27 )-----------( 361      ( 09 Sep 2021 12:34 )             25.5       09-10-21 @ 05:35      133<L>  |  103  |  <4<L>  ----------------------------<  119<H>  4.0   |  20<L>  |  0.72    21 @ 17:35      138  |  108  |  4<L>  ----------------------------<  97  3.8   |  18<L>  |  0.68    21 @ 09:07      138  |  105  |  <4<L>  ----------------------------<  81  3.4<L>   |  18<L>  |  0.68        Ca    7.2<L>      10 Sep 2021 05:35  Ca    7.9<L>      09 Sep 2021 17:35  Ca    9.2      09 Sep 2021 09:07  Mg     4.7<H>     09-10  Mg     4.7<H>       Mg     3.7<H>         TPro  5.1<L>  /  Alb  2.3<L>  /  TBili  0.3  /  DBili  x   /  AST  25  /  ALT  10  /  AlkPhos  311<H>  09-10-21 @ 05:35  TPro  5.2<L>  /  Alb  2.4<L>  /  TBili  0.6  /  DBili  x   /  AST  22  /  ALT  9<L>  /  AlkPhos  340<H>  21 @ 17:35  TPro  6.8  /  Alb  3.2<L>  /  TBili  0.5  /  DBili  x   /  AST  21  /  ALT  15  /  AlkPhos  453<H>  21 @ 09:07        Allergies    No Known Allergies    Intolerances      MEDICATIONS  (STANDING):  acetaminophen   Tablet .. 975 milliGRAM(s) Oral <User Schedule>  diphtheria/tetanus/pertussis (acellular) Vaccine (ADAcel) 0.5 milliLiter(s) IntraMuscular once  heparin   Injectable 5000 Unit(s) SubCutaneous every 12 hours  ibuprofen  Tablet. 600 milliGRAM(s) Oral every 6 hours  influenza   Vaccine 0.5 milliLiter(s) IntraMuscular once  NIFEdipine XL 30 milliGRAM(s) Oral daily  oxytocin Infusion 333.333 milliUNIT(s)/Min (1000 mL/Hr) IV Continuous <Continuous>  senna 2 Tablet(s) Oral at bedtime    MEDICATIONS  (PRN):  diphenhydrAMINE 25 milliGRAM(s) Oral every 6 hours PRN Pruritus  lanolin Ointment 1 Application(s) Topical every 6 hours PRN Sore Nipples  magnesium hydroxide Suspension 30 milliLiter(s) Oral two times a day PRN Constipation  oxyCODONE    IR 5 milliGRAM(s) Oral once PRN Moderate to Severe Pain (4-10)  oxyCODONE    IR 5 milliGRAM(s) Oral every 3 hours PRN Moderate to Severe Pain (4-10)  simethicone 80 milliGRAM(s) Chew every 4 hours PRN Gas        Assessment and Plan  POD # 2  s/p  section twins with spec --and anemia  . Stable.  Encourage ambulation  Analgesia prn  Regular diet as tolerated  s/p 2 units hct stable 25  pt to ambulate today and make sure not dizzy  procardia 30 xl started this am --follow bp closely

## 2021-09-12 RX ORDER — LABETALOL HCL 100 MG
200 TABLET ORAL EVERY 8 HOURS
Refills: 0 | Status: DISCONTINUED | OUTPATIENT
Start: 2021-09-12 | End: 2021-09-13

## 2021-09-12 RX ADMIN — Medication 200 MILLIGRAM(S): at 11:52

## 2021-09-12 RX ADMIN — OXYCODONE HYDROCHLORIDE 5 MILLIGRAM(S): 5 TABLET ORAL at 07:00

## 2021-09-12 RX ADMIN — OXYCODONE HYDROCHLORIDE 5 MILLIGRAM(S): 5 TABLET ORAL at 18:03

## 2021-09-12 RX ADMIN — Medication 975 MILLIGRAM(S): at 18:40

## 2021-09-12 RX ADMIN — Medication 200 MILLIGRAM(S): at 20:08

## 2021-09-12 RX ADMIN — Medication 975 MILLIGRAM(S): at 11:52

## 2021-09-12 RX ADMIN — Medication 975 MILLIGRAM(S): at 06:30

## 2021-09-12 RX ADMIN — OXYCODONE HYDROCHLORIDE 5 MILLIGRAM(S): 5 TABLET ORAL at 00:20

## 2021-09-12 RX ADMIN — OXYCODONE HYDROCHLORIDE 5 MILLIGRAM(S): 5 TABLET ORAL at 12:30

## 2021-09-12 RX ADMIN — Medication 975 MILLIGRAM(S): at 18:02

## 2021-09-12 RX ADMIN — Medication 60 MILLIGRAM(S): at 09:16

## 2021-09-12 RX ADMIN — Medication 975 MILLIGRAM(S): at 07:00

## 2021-09-12 RX ADMIN — OXYCODONE HYDROCHLORIDE 5 MILLIGRAM(S): 5 TABLET ORAL at 00:50

## 2021-09-12 RX ADMIN — INFLUENZA VIRUS VACCINE 0.5 MILLILITER(S): 15; 15; 15; 15 SUSPENSION INTRAMUSCULAR at 22:24

## 2021-09-12 RX ADMIN — Medication 975 MILLIGRAM(S): at 12:30

## 2021-09-12 RX ADMIN — MAGNESIUM HYDROXIDE 30 MILLILITER(S): 400 TABLET, CHEWABLE ORAL at 06:29

## 2021-09-12 RX ADMIN — HEPARIN SODIUM 5000 UNIT(S): 5000 INJECTION INTRAVENOUS; SUBCUTANEOUS at 21:17

## 2021-09-12 RX ADMIN — OXYCODONE HYDROCHLORIDE 5 MILLIGRAM(S): 5 TABLET ORAL at 12:04

## 2021-09-12 RX ADMIN — HEPARIN SODIUM 5000 UNIT(S): 5000 INJECTION INTRAVENOUS; SUBCUTANEOUS at 09:16

## 2021-09-12 RX ADMIN — OXYCODONE HYDROCHLORIDE 5 MILLIGRAM(S): 5 TABLET ORAL at 06:30

## 2021-09-12 RX ADMIN — SENNA PLUS 2 TABLET(S): 8.6 TABLET ORAL at 21:17

## 2021-09-12 RX ADMIN — OXYCODONE HYDROCHLORIDE 5 MILLIGRAM(S): 5 TABLET ORAL at 18:40

## 2021-09-12 NOTE — PROGRESS NOTE ADULT - SUBJECTIVE AND OBJECTIVE BOX
Postpartum Note,  Section    ATTENDING NOTE Post-operative day 3    Subjective:  The patient feels well.  She is ambulating.   She is tolerating regular diet.  She denies nausea and vomiting.  She is voiding.  Her pain is controlled.  She reports normal postpartum bleeding    Physical exam:    Vital Signs Last 24 Hrs  T(C): 36.4 (12 Sep 2021 09:08), Max: 37.1 (11 Sep 2021 17:00)  T(F): 97.5 (12 Sep 2021 09:08), Max: 98.8 (11 Sep 2021 17:00)  HR: 92 (12 Sep 2021 09:08) (80 - 109)  BP: 155/98 (12 Sep 2021 09:08) (136/91 - 165/102)  BP(mean): --  RR: 18 (12 Sep 2021 09:08) (18 - 18)  SpO2: 99% (12 Sep 2021 09:08) (97% - 99%)    Gen: NAD  Breast: Soft, nontender, not engorged.  Abdomen: Soft, nontender, no distension , firm uterine fundus at umbilicus.  Incision: Clean, dry, and intact  Pelvic: Normal lochia noted  Ext: No calf tenderness    LABS:      09-10-21 @ 05:35      133<L>  |  103  |  <4<L>  ----------------------------<  119<H>  4.0   |  20<L>  |  0.72    21 @ 17:35      138  |  108  |  4<L>  ----------------------------<  97  3.8   |  18<L>  |  0.68        Ca    7.2<L>      10 Sep 2021 05:35  Ca    7.9<L>      09 Sep 2021 17:35  Mg     4.7<H>     09-10  Mg     4.7<H>       Mg     3.7<H>         TPro  5.1<L>  /  Alb  2.3<L>  /  TBili  0.3  /  DBili  x   /  AST  25  /  ALT  10  /  AlkPhos  311<H>  09-10-21 @ 05:35  TPro  5.2<L>  /  Alb  2.4<L>  /  TBili  0.6  /  DBili  x   /  AST  22  /  ALT  9<L>  /  AlkPhos  340<H>  21 @ 17:35        Allergies    No Known Allergies    Intolerances      MEDICATIONS  (STANDING):  acetaminophen   Tablet .. 975 milliGRAM(s) Oral <User Schedule>  diphtheria/tetanus/pertussis (acellular) Vaccine (ADAcel) 0.5 milliLiter(s) IntraMuscular once  heparin   Injectable 5000 Unit(s) SubCutaneous every 12 hours  ibuprofen  Tablet. 600 milliGRAM(s) Oral every 6 hours  influenza   Vaccine 0.5 milliLiter(s) IntraMuscular once  labetalol 200 milliGRAM(s) Oral every 8 hours  NIFEdipine XL 60 milliGRAM(s) Oral daily  oxytocin Infusion 333.333 milliUNIT(s)/Min (1000 mL/Hr) IV Continuous <Continuous>  senna 2 Tablet(s) Oral at bedtime    MEDICATIONS  (PRN):  diphenhydrAMINE 25 milliGRAM(s) Oral every 6 hours PRN Pruritus  lanolin Ointment 1 Application(s) Topical every 6 hours PRN Sore Nipples  magnesium hydroxide Suspension 30 milliLiter(s) Oral two times a day PRN Constipation  oxyCODONE    IR 5 milliGRAM(s) Oral once PRN Moderate to Severe Pain (4-10)  oxyCODONE    IR 5 milliGRAM(s) Oral every 3 hours PRN Moderate to Severe Pain (4-10)  simethicone 80 milliGRAM(s) Chew every 4 hours PRN Gas        Assessment and Plan  POD # 3  s/p  section with SPEC off mgso4 BP still high despite procardia 30 xl-increased to 60 mg xl and labetalol 200 tid    Stable.  Encourage ambulation  Analgesia prn  Regular diet   Discharge instructions given  Follow BP closely

## 2021-09-12 NOTE — PROGRESS NOTE ADULT - SUBJECTIVE AND OBJECTIVE BOX
R3 OB ANTEPARTUM NOTE    Overnight, pt required Procardia 10mg IR x3 for SBP >150.  Patient seen and examined at bedside. No acute complaints.   Tolerating regular diet. Voiding freely. +Flatus. Ambulating without difficulty.   Denies HA, vision changes, RUQ/epigastric pain. Denies CP, SOB, N/V, fevers/chills.    Vital Signs Last 24 Hours  T(C): 36.9 (09-11-21 @ 21:54), Max: 37.1 (09-11-21 @ 17:00)  HR: 108 (09-11-21 @ 23:08) (80 - 108)  BP: 146/89 (09-11-21 @ 23:34) (139/87 - 166/79)  RR: 18 (09-11-21 @ 21:54) (18 - 18)  SpO2: 97% (09-11-21 @ 21:54) (97% - 99%)    Physical Exam:  General: NAD  Chest: nonlabored breathing  Abdomen: Soft, non-tender  Incision: Pfannenstiel C/D/I  Ext: No pain or swelling    Labs:             8.0    17.54 )-----------( 337      ( 09-10 @ 05:35 )             25.2     09-10 @ 05:35    133  |  103  |  <4  ----------------------------<  119  4.0   |  20  |  0.72    Ca    7.2      09-10 @ 05:35  Mg     4.7     09-10 @ 05:35    TPro  5.1  /  Alb  2.3  /  TBili  0.3  /  DBili  x   /  AST  25  /  ALT  10  /  AlkPhos  311  09-10 @ 05:35    PT/INR - ( 09-10 @ 05:35 )   PT: 10.4 sec;   INR: 0.86 ratio    PTT - ( 09-10 @ 05:35 )  PTT:27.4 sec    Uric Acid: (09-10 @ 05:35)  5.8      Fibrinogen: (09-10 @ 05:35)  598      LDH: (09-10 @ 05:35)  346        MEDICATIONS  (STANDING):  acetaminophen   Tablet .. 975 milliGRAM(s) Oral <User Schedule>  diphtheria/tetanus/pertussis (acellular) Vaccine (ADAcel) 0.5 milliLiter(s) IntraMuscular once  heparin   Injectable 5000 Unit(s) SubCutaneous every 12 hours  ibuprofen  Tablet. 600 milliGRAM(s) Oral every 6 hours  influenza   Vaccine 0.5 milliLiter(s) IntraMuscular once  NIFEdipine XL 60 milliGRAM(s) Oral daily  oxytocin Infusion 333.333 milliUNIT(s)/Min (1000 mL/Hr) IV Continuous <Continuous>  senna 2 Tablet(s) Oral at bedtime    MEDICATIONS  (PRN):  diphenhydrAMINE 25 milliGRAM(s) Oral every 6 hours PRN Pruritus  lanolin Ointment 1 Application(s) Topical every 6 hours PRN Sore Nipples  magnesium hydroxide Suspension 30 milliLiter(s) Oral two times a day PRN Constipation  oxyCODONE    IR 5 milliGRAM(s) Oral once PRN Moderate to Severe Pain (4-10)  oxyCODONE    IR 5 milliGRAM(s) Oral every 3 hours PRN Moderate to Severe Pain (4-10)  simethicone 80 milliGRAM(s) Chew every 4 hours PRN Gas       R3 OB ANTEPARTUM NOTE    Overnight, pt required Procardia 10mg IR x3 for SBP >150. She had previously received an additional Procardia 30 XL and 10 IR during the daytime with plans to increase to Procardia 60 9/12.   Patient seen and examined at bedside. No acute complaints. Tolerating regular diet. Voiding freely. +Flatus. Ambulating without difficulty.   Denies HA, vision changes, RUQ/epigastric pain. Denies CP, SOB, N/V, fevers/chills.    Vital Signs Last 24 Hours  T(C): 36.9 (09-11-21 @ 21:54), Max: 37.1 (09-11-21 @ 17:00)  HR: 108 (09-11-21 @ 23:08) (80 - 108)  BP: 146/89 (09-11-21 @ 23:34) (139/87 - 166/79)  RR: 18 (09-11-21 @ 21:54) (18 - 18)  SpO2: 97% (09-11-21 @ 21:54) (97% - 99%)    Physical Exam:  General: NAD  Chest: nonlabored breathing  Abdomen: Soft, non-tender  Incision: Pfannenstiel C/D/I  Ext: +2 LE edema, nontender, nonerythematous    Labs:             8.0    17.54 )-----------( 337      ( 09-10 @ 05:35 )             25.2     09-10 @ 05:35    133  |  103  |  <4  ----------------------------<  119  4.0   |  20  |  0.72    Ca    7.2      09-10 @ 05:35  Mg     4.7     09-10 @ 05:35    TPro  5.1  /  Alb  2.3  /  TBili  0.3  /  DBili  x   /  AST  25  /  ALT  10  /  AlkPhos  311  09-10 @ 05:35    PT/INR - ( 09-10 @ 05:35 )   PT: 10.4 sec;   INR: 0.86 ratio    PTT - ( 09-10 @ 05:35 )  PTT:27.4 sec    Uric Acid: (09-10 @ 05:35)  5.8      Fibrinogen: (09-10 @ 05:35)  598      LDH: (09-10 @ 05:35)  346        MEDICATIONS  (STANDING):  acetaminophen   Tablet .. 975 milliGRAM(s) Oral <User Schedule>  diphtheria/tetanus/pertussis (acellular) Vaccine (ADAcel) 0.5 milliLiter(s) IntraMuscular once  heparin   Injectable 5000 Unit(s) SubCutaneous every 12 hours  ibuprofen  Tablet. 600 milliGRAM(s) Oral every 6 hours  influenza   Vaccine 0.5 milliLiter(s) IntraMuscular once  NIFEdipine XL 60 milliGRAM(s) Oral daily  oxytocin Infusion 333.333 milliUNIT(s)/Min (1000 mL/Hr) IV Continuous <Continuous>  senna 2 Tablet(s) Oral at bedtime    MEDICATIONS  (PRN):  diphenhydrAMINE 25 milliGRAM(s) Oral every 6 hours PRN Pruritus  lanolin Ointment 1 Application(s) Topical every 6 hours PRN Sore Nipples  magnesium hydroxide Suspension 30 milliLiter(s) Oral two times a day PRN Constipation  oxyCODONE    IR 5 milliGRAM(s) Oral once PRN Moderate to Severe Pain (4-10)  oxyCODONE    IR 5 milliGRAM(s) Oral every 3 hours PRN Moderate to Severe Pain (4-10)  simethicone 80 milliGRAM(s) Chew every 4 hours PRN Gas

## 2021-09-12 NOTE — PROVIDER CONTACT NOTE (OTHER) - BACKGROUND
C/s Day 2 PPH 3200 s/p 2 units of PRBCs, TXA and cytotec. s/p Mg on procardia 60xl starting tomorrow.  Hx GDM.

## 2021-09-13 ENCOUNTER — TRANSCRIPTION ENCOUNTER (OUTPATIENT)
Age: 36
End: 2021-09-13

## 2021-09-13 VITALS
HEART RATE: 80 BPM | RESPIRATION RATE: 18 BRPM | SYSTOLIC BLOOD PRESSURE: 140 MMHG | TEMPERATURE: 98 F | DIASTOLIC BLOOD PRESSURE: 88 MMHG

## 2021-09-13 PROCEDURE — 36430 TRANSFUSION BLD/BLD COMPNT: CPT

## 2021-09-13 PROCEDURE — 86769 SARS-COV-2 COVID-19 ANTIBODY: CPT

## 2021-09-13 PROCEDURE — 86850 RBC ANTIBODY SCREEN: CPT

## 2021-09-13 PROCEDURE — 82570 ASSAY OF URINE CREATININE: CPT

## 2021-09-13 PROCEDURE — 85384 FIBRINOGEN ACTIVITY: CPT

## 2021-09-13 PROCEDURE — 86900 BLOOD TYPING SEROLOGIC ABO: CPT

## 2021-09-13 PROCEDURE — 59050 FETAL MONITOR W/REPORT: CPT

## 2021-09-13 PROCEDURE — P9016: CPT

## 2021-09-13 PROCEDURE — 83615 LACTATE (LD) (LDH) ENZYME: CPT

## 2021-09-13 PROCEDURE — 85027 COMPLETE CBC AUTOMATED: CPT

## 2021-09-13 PROCEDURE — 86901 BLOOD TYPING SEROLOGIC RH(D): CPT

## 2021-09-13 PROCEDURE — 59025 FETAL NON-STRESS TEST: CPT

## 2021-09-13 PROCEDURE — 83735 ASSAY OF MAGNESIUM: CPT

## 2021-09-13 PROCEDURE — 82962 GLUCOSE BLOOD TEST: CPT

## 2021-09-13 PROCEDURE — 85610 PROTHROMBIN TIME: CPT

## 2021-09-13 PROCEDURE — 88307 TISSUE EXAM BY PATHOLOGIST: CPT

## 2021-09-13 PROCEDURE — 80053 COMPREHEN METABOLIC PANEL: CPT

## 2021-09-13 PROCEDURE — C1889: CPT

## 2021-09-13 PROCEDURE — 85730 THROMBOPLASTIN TIME PARTIAL: CPT

## 2021-09-13 PROCEDURE — 84550 ASSAY OF BLOOD/URIC ACID: CPT

## 2021-09-13 PROCEDURE — 86780 TREPONEMA PALLIDUM: CPT

## 2021-09-13 PROCEDURE — 85025 COMPLETE CBC W/AUTO DIFF WBC: CPT

## 2021-09-13 PROCEDURE — 86923 COMPATIBILITY TEST ELECTRIC: CPT

## 2021-09-13 PROCEDURE — 81001 URINALYSIS AUTO W/SCOPE: CPT

## 2021-09-13 PROCEDURE — 90686 IIV4 VACC NO PRSV 0.5 ML IM: CPT

## 2021-09-13 PROCEDURE — 84156 ASSAY OF PROTEIN URINE: CPT

## 2021-09-13 RX ORDER — NIFEDIPINE 30 MG
1 TABLET, EXTENDED RELEASE 24 HR ORAL
Qty: 30 | Refills: 0
Start: 2021-09-13 | End: 2021-10-12

## 2021-09-13 RX ORDER — FERROUS SULFATE 325(65) MG
1 TABLET ORAL
Qty: 0 | Refills: 0 | DISCHARGE

## 2021-09-13 RX ORDER — MAGNESIUM CHLORIDE
1 CRYSTALS MISCELLANEOUS
Qty: 0 | Refills: 0 | DISCHARGE

## 2021-09-13 RX ORDER — LABETALOL HCL 100 MG
1 TABLET ORAL
Qty: 90 | Refills: 0
Start: 2021-09-13 | End: 2021-10-12

## 2021-09-13 RX ORDER — IBUPROFEN 200 MG
600 TABLET ORAL EVERY 6 HOURS
Refills: 0 | Status: DISCONTINUED | OUTPATIENT
Start: 2021-09-13 | End: 2021-09-13

## 2021-09-13 RX ORDER — ASPIRIN/CALCIUM CARB/MAGNESIUM 324 MG
0 TABLET ORAL
Qty: 0 | Refills: 0 | DISCHARGE

## 2021-09-13 RX ORDER — FOLIC ACID 0.8 MG
1 TABLET ORAL
Qty: 0 | Refills: 0 | DISCHARGE

## 2021-09-13 RX ADMIN — Medication 975 MILLIGRAM(S): at 00:34

## 2021-09-13 RX ADMIN — OXYCODONE HYDROCHLORIDE 5 MILLIGRAM(S): 5 TABLET ORAL at 06:09

## 2021-09-13 RX ADMIN — Medication 60 MILLIGRAM(S): at 09:23

## 2021-09-13 RX ADMIN — Medication 975 MILLIGRAM(S): at 01:04

## 2021-09-13 RX ADMIN — Medication 200 MILLIGRAM(S): at 11:52

## 2021-09-13 RX ADMIN — OXYCODONE HYDROCHLORIDE 5 MILLIGRAM(S): 5 TABLET ORAL at 00:35

## 2021-09-13 RX ADMIN — Medication 600 MILLIGRAM(S): at 11:50

## 2021-09-13 RX ADMIN — Medication 200 MILLIGRAM(S): at 04:39

## 2021-09-13 RX ADMIN — OXYCODONE HYDROCHLORIDE 5 MILLIGRAM(S): 5 TABLET ORAL at 01:04

## 2021-09-13 RX ADMIN — Medication 975 MILLIGRAM(S): at 11:52

## 2021-09-13 RX ADMIN — Medication 975 MILLIGRAM(S): at 06:09

## 2021-09-13 NOTE — PROGRESS NOTE ADULT - ASSESSMENT
A/P: 35yo POD#1 s/p LTCS c/b bladder flap hematoma w/ intraop EBL 3200 and recieved 2uPRBC. Pt is also a sPEC (BPs) and currently on MG.  Patient is stable and doing well post-operatively.    - cw Mg for seizure ppx  - AM HELLP Labs  - Continue regular diet.  - Increase ambulation.  - Continue motrin, tylenol, oxycodone PRN for pain control.    Dante Harris PGY3 
35yo POD#3 s/p LTCS c/b bladder flap hematoma, intraop 2u PRBC transfusion. EBL 3200. Pregnancy c/b sPEC (BPs) s/p MgSO4 for seizure ppx. Continued titration of antiHTN medications for adequate BP control. Progressing appropriately in postoperative period.     #sPEC (BP)  - monitor BP, SpO2, UOP  - uptitrated to Procardia XL 60mg to start 9/12  - s/p additional Procardia 30 and Procardia 10/10/10/10 (9/11)  - s/p MgSO4 for seizure ppx  - s/p Lab 20/40/80 (9/7)    #postoperative state  - Tylenol, Motrin, Oxy PRN  - Reg, SLIV  - Senna, Simethicone  - VTE ppx: HSQ, SCDs, DALE Allen R3
37yo POD#4 s/p LTCS c/b bladder flap hematoma, intraop 2u PRBC transfusion. EBL 3200. Pregnancy c/b sPEC (BPs) s/p MgSO4 for seizure ppx. Continued titration of antiHTN medications for adequate BP control. Progressing appropriately in postoperative period.     #sPEC (BP)  - monitor BP, SpO2, UOP  - uptitrated to Procardia XL 60mg to start 9/12 and Labetalol 200TID (9/12)  - s/p additional Procardia 30 and Procardia 10/10/10/10 (9/11)  - s/p MgSO4 for seizure ppx  - s/p Lab 20/40/80 (9/7)    #postoperative state  - Tylenol, Motrin, Oxy PRN  - Reg, SLIV  - Senna, Simethicone  - VTE ppx: HSQ, SCDs, OOB    Dante Harris PGY3 
A/P: 37yo POD#2 s/p LTCS c/b bladder flap hematoma w/ intraop EBL 3200 and recieved 2uPRBC. Pt is also a sPEC (BPs) and currently on MG.  Patient is stable and doing well post-operatively.    - s/p Mg for seizure ppx  - BP within normal range  - Continue regular diet.  - Increase ambulation.  - Continue motrin, tylenol, oxycodone PRN for pain control.    Dante Harris PGY3

## 2021-09-13 NOTE — DISCHARGE NOTE OB - MEDICATION SUMMARY - MEDICATIONS TO CHANGE
patient/family I will SWITCH the dose or number of times a day I take the medications listed below when I get home from the hospital:  None

## 2021-09-13 NOTE — DISCHARGE NOTE OB - MEDICATION SUMMARY - MEDICATIONS TO TAKE
I will START or STAY ON the medications listed below when I get home from the hospital:    Trandate 200 mg oral tablet  -- 1 tab(s) by mouth every 8 hours  -- Indication: For Hypertension    Procardia XL 60 mg oral tablet, extended release  -- 1 tab(s) by mouth once a day  -- Indication: For Hypertension

## 2021-09-13 NOTE — DISCHARGE NOTE OB - COMMENTS
Check blood pressure 2 x daily (prior to procardia  and 12 hours later)  Notify md for blood pressure greater than 150/90 and/or s/s of hypertension.  Do not take procadia if blood pressure is less than 110/70.

## 2021-09-13 NOTE — DISCHARGE NOTE OB - PATIENT PORTAL LINK FT
You can access the FollowMyHealth Patient Portal offered by [facility] by registering at the following website: [email or web address]. By joining [employer]’s FollowMyHealth portal, you will also be able to view your health information using other applications (apps) compatible with our system.

## 2021-09-13 NOTE — PROGRESS NOTE ADULT - ATTENDING COMMENTS
Doing well  VSS afeb  Abd S NT ND FF min lochia  inc c/d/i  S/P C/S for twins, c/b by PEC, stable for dc home  Instruc reviewed  BPs stabilized on Procardia and Labetalol.  Home BP monitoring and parameters reviewed.  Fu 2 wks.  DEEPTHI Riddle

## 2021-09-13 NOTE — DISCHARGE NOTE OB - MATERIALS PROVIDED
Vaccinations/[facility]  Screening Program/  Immunization Record/Bottle Feeding Log/Breastfeeding Mother’s Support Group Information/Guide to Postpartum Care/[facility] Hearing Screen Program/Back To Sleep Handout pec/post birth warning signs/Vaccinations/[facility]  Screening Program/[place]  Immunization Record/Bottle Feeding Log/Breastfeeding Mother’s Support Group Information/Guide to Postpartum Care/[facility] Hearing Screen Program/Back To Sleep Handout

## 2021-09-13 NOTE — DISCHARGE NOTE OB - CARE PROVIDER_API CALL
Mauricio Riddle (MD)  Obstetrics and Gynecology  [address], [address]  [place] [id number]  Phone: [phone]  Fax: [phone]  Follow Up Time:

## 2021-09-13 NOTE — PROGRESS NOTE ADULT - SUBJECTIVE AND OBJECTIVE BOX
OB Postpartum Note:  Delivery, POD#4    S: 35yo POD#4 s/p LTCS. The patient feels well.  Pain is well controlled. She is tolerating a regular diet and passing flatus. She is voiding spontaneously, and ambulating without difficulty. Denies CP/SOB. Denies lightheadedness/dizziness. Denies N/V. Denies sxs of PEC: no headaches, visual changes, RUQ pain, and respiratory distress    O:  Vitals:  Vital Signs Last 24 Hrs  T(C): 36.8 (13 Sep 2021 00:46), Max: 36.8 (12 Sep 2021 17:00)  T(F): 98.3 (13 Sep 2021 00:46), Max: 98.3 (12 Sep 2021 20:11)  HR: 84 (13 Sep 2021 00:46) (84 - 109)  BP: 129/80 (13 Sep 2021 00:46) (123/84 - 155/98)  BP(mean): --  RR: 18 (13 Sep 2021 00:46) (18 - 18)  SpO2: 98% (13 Sep 2021 00:46) (96% - 99%)    MEDICATIONS  (STANDING):  acetaminophen   Tablet .. 975 milliGRAM(s) Oral <User Schedule>  diphtheria/tetanus/pertussis (acellular) Vaccine (ADAcel) 0.5 milliLiter(s) IntraMuscular once  heparin   Injectable 5000 Unit(s) SubCutaneous every 12 hours  ibuprofen  Tablet. 600 milliGRAM(s) Oral every 6 hours  labetalol 200 milliGRAM(s) Oral every 8 hours  NIFEdipine XL 60 milliGRAM(s) Oral daily  oxytocin Infusion 333.333 milliUNIT(s)/Min (1000 mL/Hr) IV Continuous <Continuous>  senna 2 Tablet(s) Oral at bedtime    MEDICATIONS  (PRN):  diphenhydrAMINE 25 milliGRAM(s) Oral every 6 hours PRN Pruritus  lanolin Ointment 1 Application(s) Topical every 6 hours PRN Sore Nipples  magnesium hydroxide Suspension 30 milliLiter(s) Oral two times a day PRN Constipation  oxyCODONE    IR 5 milliGRAM(s) Oral once PRN Moderate to Severe Pain (4-10)  oxyCODONE    IR 5 milliGRAM(s) Oral every 3 hours PRN Moderate to Severe Pain (4-10)  simethicone 80 milliGRAM(s) Chew every 4 hours PRN Gas      LABS:  Blood type: A Positive  Rubella IgG: RPR: Negative                          8.0<L>   17.54<H> >-----------< 337    ( 09-10 @ 05:35 )             25.2<L>    09-10-21 @ 05:35      133<L>  |  103  |  <4<L>  ----------------------------<  119<H>  4.0   |  20<L>  |  0.72        Ca    7.2<L>      10 Sep 2021 05:35  Mg     4.7<H>     09-10    TPro  5.1<L>  /  Alb  2.3<L>  /  TBili  0.3  /  DBili  x   /  AST  25  /  ALT  10  /  AlkPhos  311<H>  09-10-21 @ 05:35      Physical exam:  Gen: NAD  Abdomen: Soft, nontender, no distension , firm uterine fundus at umbilicus.  Incision: Clean, dry, and intact   Pelvic: Normal lochia noted  Ext: No calf tenderness

## 2021-09-13 NOTE — DISCHARGE NOTE OB - INFLUENZA IMMUNIZATION DATE (APPROXIMATE):
12-Sep-2021
Airway patent, Nasal mucosa clear. Mouth with normal mucosa. Throat has no vesicles, no oropharyngeal exudates and uvula is midline.

## 2021-09-20 LAB — SURGICAL PATHOLOGY STUDY: SIGNIFICANT CHANGE UP

## 2021-09-23 ENCOUNTER — APPOINTMENT (OUTPATIENT)
Dept: CARDIOLOGY | Facility: CLINIC | Age: 36
End: 2021-09-23
Payer: COMMERCIAL

## 2021-09-23 DIAGNOSIS — Z82.3 FAMILY HISTORY OF STROKE: ICD-10-CM

## 2021-09-23 DIAGNOSIS — Z86.32 PERSONAL HISTORY OF GESTATIONAL DIABETES: ICD-10-CM

## 2021-09-23 DIAGNOSIS — Z82.49 FAMILY HISTORY OF ISCHEMIC HEART DISEASE AND OTHER DISEASES OF THE CIRCULATORY SYSTEM: ICD-10-CM

## 2021-09-23 DIAGNOSIS — O24.419 GESTATIONAL DIABETES MELLITUS IN PREGNANCY, UNSPECIFIED CONTROL: ICD-10-CM

## 2021-09-23 DIAGNOSIS — O14.10 SEVERE PRE-ECLAMPSIA, UNSPECIFIED TRIMESTER: ICD-10-CM

## 2021-09-23 PROCEDURE — 99214 OFFICE O/P EST MOD 30 MIN: CPT | Mod: 95

## 2021-09-23 RX ORDER — NIFEDIPINE 60 MG/1
60 TABLET, FILM COATED, EXTENDED RELEASE ORAL
Qty: 90 | Refills: 3 | Status: ACTIVE | COMMUNITY
Start: 2021-09-23 | End: 1900-01-01

## 2021-09-23 RX ORDER — LABETALOL HYDROCHLORIDE 200 MG/1
200 TABLET, FILM COATED ORAL EVERY 8 HOURS
Qty: 180 | Refills: 3 | Status: ACTIVE | COMMUNITY
Start: 2021-09-23 | End: 1900-01-01

## 2021-09-23 NOTE — DISCUSSION/SUMMARY
[FreeTextEntry1] : Ms. Sepulveda is a  37yo F s/p scheduled  section twin babies  21, c/b bladder flap hematoma, intraop 2u PRBC transfusion. EBL 3200. Pregnancy c/b sPEC (BPs) s/p MgSO4 for seizure ppx.\par BP log : 123- 146/76 -91. \par \par #1 sPEC \par BP Stable \par Encouraged Patient to monitor BP at home and keep a log and report results back to us for evaluation. Based on results, we will adjust medications as necessary. \par Additionally, encouraged heart healthy diet and exercise as tolerated.\par Advised to call should she have complaints of chest pain, abdominal pain, visual disturbances, HA and blood pressure elevated >160/100 \par Provided OB coordinator cell for urgent need ( [phone]\par \par #2 Gestational DM - Blood sugars stable \par \par Follow up in 2 Months\par \par

## 2021-09-27 ENCOUNTER — APPOINTMENT (OUTPATIENT)
Dept: MATERNAL FETAL MEDICINE | Facility: CLINIC | Age: 36
End: 2021-09-27
Payer: COMMERCIAL

## 2021-09-27 PROCEDURE — G0108 DIAB MANAGE TRN  PER INDIV: CPT | Mod: 95

## 2022-02-17 NOTE — OB RN INTRAOPERATIVE NOTE - NS_IMPLANTS_SURGICEL HEMOSTAT FIBRILLAR EXPIRATION DATE_OBGYN_ALL_OB_DT
31-Jul-2023
Patient requests all Lab, Cardiology, and Radiology Results on their Discharge Instructions

## 2022-08-19 NOTE — OB PROVIDER H&P - NS_PREOPBLOODCONS_OBGYN_ALL_OB
pt BP is 74/42, reading from the a line with a good wave form. pt is not symptomatic and HR is stable. n/a

## 2023-07-24 NOTE — ED CDU PROVIDER SUBSEQUENT DAY NOTE - NS ED MD CDU HISTORY DATE TIME DT
OPERATIVE NOTE      Patient: Sayra Vu    MRN: [id number]    Date of surgery:  7/24/2023    Preoperative Diagnosis:  Nuclear sclerotic and cortical cataract, Left eye    Postoperative Diagnosis:  Nuclear sclerotic and cortical cataract, Left eye    Procedure:  Cataract extraction with intraocular lens implantation, Left eye    Surgeon:  Vee Brice MD    Surgical Assistants:  None    Surgical Assistant Tasks:  None    Anesthesia:  Local MAC (monitored anesthesia care), topical Tetracaine and intracameral preservative free 1% lidocaine.     Specimens removed:  None    Implant: CLARKE ZCBOO 21.0    Estimated blood loss:  Minimal    Findings of the procedure:  Nuclear sclerotic and cortical cataract, Left eye    Complications:  None    Description of Procedure:    The patient was brought into the preoperative area where the left eye was marked as the operative eye.  After all remaining questions had been answered the patient was taken to the operating room.  There the patient was given small amount of IV sedation and anesthesia as per anesthesia staff.  A drop of Tetracaine and Betadine were placed into the patient's eye. The eye was then prepped and draped in the usual sterile fashion for ophthalmic surgery.  Documented time out was then performed and the nursing staff, anesthesia staff and surgical staff agreed upon the patient, the type of surgery and location of surgery.  Intraocular lens calculations were reviewed and the appropriate intraocular lens was chosen for the case.    Lid speculum was then placed in the patient's eye.  The microscope was brought into position.  A sideport incision was made using a 1.1 mm sideport blade at 6 o’clock position.  Preservative free lidocaine was injected into the anterior chamber.  The anterior chamber was then filled with viscoelastic.  A near clear corneal incision was made at 2 o’clock position using a  2.75 mm keratome blade.  A tear was then created in the anterior capsule using a cystotome.  The tear was extended into a continuous curvilinear capsulorrhexis using the Utrata forceps.  Hydrodissection was performed using BSS (balanced salt solution) on a cannula.  Phacoemulsification handpiece was used to sculpt the nucleus and the nucleus was then divided into 4 pieces using the Phaco handpiece and a second instrument.  The pieces were removed from the eye using the Phaco handpiece without any complications. The rest of the cortex was then removed using the I/A handpiece.  Viscoelastic was then injected into the anterior chamber and the capsular bag.  An CLARKE ZCBOO lens of diopteric power 21.0 diopters and lot number #[id number] was then injected into the capsular bag.  The lens was rotated into position using the Sinskey hook.  All remaining viscoelastic was removed from the eye using the I/A handpiece.  Miochol and Moxifloxacin were then injected into the anterior chamber through the sideport incision.  After ensuring that the intraocular pressure was adequate and the wounds watertight, the speculum and drapes were removed and the eye and the surrounding area was cleaned and dried.  A drop of Predforte and Vigamox were placed in the patient's eye and the eye was shielded.  The patient was taken from the operating room in stable condition having tolerated the procedure well and suffering no complications.     01-Sep-2020 10:22

## 2023-10-24 NOTE — ED PROVIDER NOTE - OBJECTIVE STATEMENT
See Physician's progress note.
36 y/o female no PMH (currently undergoing fertility treatment - clomid/hcg shot) presents to ER  for abdominal pain. Pt. states that earlier this mornign developed right sided abdominal pain - states felt better when she lies on her side and has waxed and waned throughout the day but slightly better now. Pt. went to [facility] urgent care today and was sent to ER for ct scan. Pt. currently rates pain 5/10 - admits to mild nausea and lack of appetite. Deneis fever chills vaginal bleeding or discharge.

## 2024-01-01 NOTE — OB PROVIDER H&P - NS_PRENATALLABSOURCEGBS36_OBGYN_ALL_OB
Mom explained that patient received vaccines today; should they preventively give tylenol? Advised to watch temp and activity, can give tylenol if fussy or uncomfortable. Provided dosing recommendations for in clinic weight today.    hard copy, drawn during this pregnancy

## 2024-03-13 NOTE — HISTORY OF PRESENT ILLNESS
[Home] : at home, [unfilled] , at the time of the visit. [Verbal consent obtained from patient] : the patient, [unfilled] [FreeTextEntry1] : Ms. Sepulveda presents to establish care in the [facility] program. She is a 35yo s/p  section twin babies ( scheduled ) 21, c/b bladder flap hematoma, intraop 2u PRBC transfusion. EBL 3200. Pregnancy c/b sPEC (BPs) s/p MgSO4 for seizure ppx. She carries a strong family history of CAD / PCI ( dad ) and mom CAD and HTN and personal history of severe preeclampsia. Denies chest pain, shortness of breath, dizziness, lightheadedness, palpitations or near syncope or syncope, orthopnea, PND and increasing lower extremity edema.She lost 49 lbs since delivery. \par BP log : 123- 146/76 -91. \par \par #sPEC \par BP Stable \par Encouraged Patient to monitor BP at home and keep a log and report results back to us for evaluation. Based on results, we will adjust medications as necessary. \par Additionally, encouraged heart healthy diet and exercise as tolerated.\par Advised to call should she have complaints of chest pain, abdominal pain, visual disturbances, HA and blood pressure elevated >160/100 \par Provided OB coordinator cell for urgent need ( [phone]\par  Follow up in 2 Months\par \par \par Gestational DM - Blood sugars stable \par \par  Attending Attestation (For Attendings USE Only)...

## 2024-07-17 NOTE — OB RN INTRAOPERATIVE NOTE - NS_POSTSURGSKIN_OBGYN_ALL_OB
SW met with patient today at [facility] to introduce self and provide contact information as the new . Patient is managing well and express no needs at this time. SW will continue to make visits with patient.   Other

## 2024-09-17 NOTE — OB RN INTRAOPERATIVE NOTE - NS_INTERMITTENTPNEUMATICCOMPRESSIOUNITBIOMEDNUMBER_OBGYN_ALL_OB_FT
Quality 226: Preventive Care And Screening: Tobacco Use: Screening And Cessation Intervention: Patient screened for tobacco use and is an ex/non-smoker
Detail Level: Detailed
[id number]